# Patient Record
Sex: FEMALE | Race: BLACK OR AFRICAN AMERICAN | HISPANIC OR LATINO | Employment: STUDENT | URBAN - METROPOLITAN AREA
[De-identification: names, ages, dates, MRNs, and addresses within clinical notes are randomized per-mention and may not be internally consistent; named-entity substitution may affect disease eponyms.]

---
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
## 2023-01-10 ENCOUNTER — INITIAL PRENATAL (OUTPATIENT)
Dept: OBGYN CLINIC | Facility: CLINIC | Age: 18
End: 2023-01-10

## 2023-01-10 DIAGNOSIS — Z3A.13 13 WEEKS GESTATION OF PREGNANCY: Primary | ICD-10-CM

## 2023-01-10 DIAGNOSIS — O21.9 NAUSEA AND VOMITING OF PREGNANCY, ANTEPARTUM: ICD-10-CM

## 2023-01-10 DIAGNOSIS — Z34.92 PRENATAL CARE IN SECOND TRIMESTER: Primary | ICD-10-CM

## 2023-01-10 RX ORDER — FERROUS SULFATE TAB EC 324 MG (65 MG FE EQUIVALENT) 324 (65 FE) MG
324 TABLET DELAYED RESPONSE ORAL
Qty: 90 TABLET | Refills: 1 | Status: SHIPPED | OUTPATIENT
Start: 2023-01-10

## 2023-01-10 RX ORDER — PRENATAL VIT/IRON FUM/FOLIC AC 27MG-0.8MG
1 TABLET ORAL DAILY
Qty: 90 TABLET | Refills: 1 | Status: SHIPPED | OUTPATIENT
Start: 2023-01-10

## 2023-01-10 RX ORDER — DOCUSATE SODIUM 100 MG/1
100 CAPSULE, LIQUID FILLED ORAL 2 TIMES DAILY
Qty: 60 CAPSULE | Refills: 3 | Status: SHIPPED | OUTPATIENT
Start: 2023-01-10

## 2023-01-10 RX ORDER — ONDANSETRON 4 MG/1
4 TABLET, FILM COATED ORAL EVERY 8 HOURS PRN
Qty: 20 TABLET | Refills: 3 | Status: SHIPPED | OUTPATIENT
Start: 2023-01-10

## 2023-01-10 RX ORDER — ONDANSETRON 4 MG/1
4 TABLET, FILM COATED ORAL EVERY 8 HOURS PRN
Qty: 20 TABLET | Refills: 0 | Status: SHIPPED | OUTPATIENT
Start: 2023-01-10

## 2023-01-10 RX ORDER — PNV NO.95/FERROUS FUM/FOLIC AC 28MG-0.8MG
1 TABLET ORAL DAILY
Qty: 90 TABLET | Refills: 3 | Status: SHIPPED | OUTPATIENT
Start: 2023-01-10

## 2023-01-10 RX ORDER — FERROUS SULFATE TAB EC 324 MG (65 MG FE EQUIVALENT) 324 (65 FE) MG
324 TABLET DELAYED RESPONSE ORAL
Qty: 60 TABLET | Refills: 3 | Status: SHIPPED | OUTPATIENT
Start: 2023-01-10

## 2023-01-10 NOTE — PROGRESS NOTES
OB INTAKE INTERVIEW  Pt presents for OB intake    OB History    Para Term  AB Living   1             SAB IAB Ectopic Multiple Live Births                  # Outcome Date GA Lbr Adama/2nd Weight Sex Delivery Anes PTL Lv   1 Current              Hx of  delivery prior to 36 weeks 6 days:  N/A   If yes, place a referral for cervical surveillance at 16 weeks  Last Menstrual Period:    Patient's last menstrual period was 10/27/2022  Ultrasound date: 2022  12 weeks 6 days     Estimated Date of Delivery: 23   by US  H&P visit scheduled  2023 @ 1515  with Dr Berenice Dodson, I      Last pap smear: N/A     Current Issues:  Constipation :   No  Headaches :   Yes  Cramping:  Yes, light  Spotting :   No  PICA cravings :  No  FOB Involved: No  Planned pregnancy:  No  I have these concerns about this prenatal patient:   Pt presents to appt with mother  C/O nausea and vomiting even with Vit B6 & Unisom combo  Would like prescription for zofran, as well as iron pills, and PNV  Will send request to provider  Interested in discussing increase in asthma symptoms, lump in right armpit, and DNA testing at next visit  Would like to meet with financial counselor for assistance applying for [facility] and other [employer] help  Reports financial concern as she does not currently have a job  Mother reports transportation concerns as she pays for uber to/from appts  Information provided for both care management and financial counselor  Reports trouble sleeping at night  Reviewed safe medication list with pt  Daily THC use  Encouraged to cut down/stop  Would like to discuss further options with provider as she feels this helps with the nausea  Interview education  • [facility] Pregnancy Essentials reviewed and discussed   • [facility] Handout  • [facility] MFM Handout  • Discussed genetic testing  • Prenatal lab work: Scripts printed and given to pt     • Influenza vaccine given today: No  • Discussed Tdap vaccine  Immunizations:   Immunization History   Administered Date(s) Administered   • DTaP 2003, 2003, 2003, 07/14/2004, 08/16/2007   • HPV Quadrivalent 06/11/2014, 07/16/2014, 12/17/2014   • Hep A, adult 06/11/2014, 12/17/2014   • Hep B, Adolescent or Pediatric 2003, 2003, 2003   • HiB 2003, 2003, 2003, 07/14/2004   • INFLUENZA 10/11/2012, 10/02/2013, 03/28/2018   • IPV 2003, 2003, 2003, 08/16/2007   • Influenza, injectable, quadrivalent, preservative free 0 5 mL 11/12/2020   • MMR 07/14/2004, 08/16/2007   • Meningococcal MCV4P 06/11/2014, 11/12/2020   • Pneumococcal Conjugate PCV 7 2003, 2003, 07/14/2004   • Tdap 06/11/2014   • Varicella 07/14/2005, 05/10/2012     Depression Screening Follow-up Plan: Patient's depression screening was negative with an [place] score of  7  Clinically patient does not have depression  No treatment is required     Nurse/Family Partnership- referral placed:  Yes   If yes, place referral for nurse family partnership  BMI Counseling:   Tobacco Cessation Counseling: non-smoker  Infection Screening: Does the pt have a hx of MRSA? No  If yes- please follow MRSA protocol and obtain a nasal swab for MRSA culture  The patient was oriented to our practice and all questions were answered    Interviewed by: Sincere Graff RN 01/10/23

## 2023-01-10 NOTE — LETTER
[facility] Letter    Quinn Hatchet  2005  [facility] [facility] [address]       01/10/23          Quinn Hatchet is a patient and under our care in our office  Frank Tran's Estimated Date of Delivery: 7/8/23  Any questions or concerns feel free to contact our office       Thank you,  [address]  [address] [facility]/[address]  [facility] [place] [place]  [address]  [phone]

## 2023-01-10 NOTE — LETTER
Proof of Pregnancy Letter    Edgar Lombardo  2005  [address] [facility] [address]        01/10/23      Edgar Lombardo is a patient at our facility  Max Farias Estimated Date of Delivery: 7/8/23       Any questions or concerns, please feel free to contact our office  Sincerely,    1 [facility]    [address], [facility]

## 2023-01-10 NOTE — LETTER
Dentist Letter    Buckgrant Lombardo  2005  [address] [facility] [address]          01/10/23    We have had several requests from local dentist requesting permission to perform procedures on our patients who are pregnant  We wish to respond with this letter regarding some of the more routine procedures that we have been asked about  The following procedures may be performed on our obstetric patients:   1  Administration of local anesthesia   2  Administration of antibiotics such as PCN, Ampicillin, and Erythromycin  3  Administration of pain medications such as Tylenol, Tylenol with codeine, and if needed Percocet  4  Shielded X-rays    Should you have any questions, please do not hesitate to contact at [phone]          Sincerely,    1 [facility]   [phone]

## 2023-01-10 NOTE — LETTER
Work Letter    Rohit Benson  2005  [facility]    Dear Rohit Benson,      01/10/23        Your employee is a patient at [facility]  We recommend that all pregnant women:    1  Have a well-ventilated workspace  2  Wear low-heeled shoes  3  Work no more than 40 hours per week  4  Have a 15 minute break every 2 hours and at least 30 minutes for a meal break  5  Use good body mechanics by bending at your knees to avoid back strain and lift no more than 20 pounds without assistance  Will need assistance with lifting over 20 lbs  6  Have ready access to bathrooms and water  She may continue to work until her due date unless medical complications arise  We anticipate she may return to work in 6-8 weeks after delivery       Sincerely,  1 Arline Contreras

## 2023-01-13 ENCOUNTER — PATIENT OUTREACH (OUTPATIENT)
Dept: OBGYN CLINIC | Facility: CLINIC | Age: 18
End: 2023-01-13

## 2023-01-13 NOTE — PROGRESS NOTES
EDVIN LEWIS spoke with 22 y/o-S- G1-  English speaking young woman for pre addie assessment  Pt resides with her parents and siblings  Pt reported pregnancy was not intended but welcomed  Pt not sure of who is the baby father, both men are aware  Pt denies any current usage of alcohol or smoking  Pt using THC because it helps with her nausea  Pr was educated regarding THC side effects on the unborn baby and process after delivery with positive drug screen  Pt denies any Mental Health or Domestic violence hx  Pt has MA and need to get [facility]  Pt reported her mom and a friend are very supportive  Pt uses [employer] for transportation  Pt reported she has been feeling sad and anxious  EDVIN LEWIS discussed referral for [address] and Pt reported she has the phone number to call for intake and she will call them  EDVIN LEWIS offered assistance to schedule appointment but Pt want to call  EDVIN LEWIS explained her role and gave contact information  Pt was encouraged to call at any time needed

## 2023-01-19 ENCOUNTER — TELEPHONE (OUTPATIENT)
Facility: HOSPITAL | Age: 18
End: 2023-01-19

## 2023-01-19 NOTE — TELEPHONE ENCOUNTER
Patient called to schedule an earlier appointment before 2/20  Patient referral does not indicate a sooner appointment to be scheduled   Referred the patient to call OBGYN to obtain a new referral

## 2023-01-23 ENCOUNTER — APPOINTMENT (OUTPATIENT)
Dept: LAB | Facility: CLINIC | Age: 18
End: 2023-01-23

## 2023-01-23 DIAGNOSIS — Z34.92 PRENATAL CARE IN SECOND TRIMESTER: ICD-10-CM

## 2023-01-23 LAB
ABO GROUP BLD: NORMAL
AMORPH URATE CRY URNS QL MICRO: ABNORMAL
BACTERIA UR QL AUTO: ABNORMAL /HPF
BASOPHILS # BLD AUTO: 0.06 THOUSANDS/ÂΜL (ref 0–0.1)
BASOPHILS NFR BLD AUTO: 1 % (ref 0–1)
BILIRUB UR QL STRIP: NEGATIVE
BLD GP AB SCN SERPL QL: NEGATIVE
CLARITY UR: ABNORMAL
COLOR UR: YELLOW
EOSINOPHIL # BLD AUTO: 0.06 THOUSAND/ÂΜL (ref 0–0.61)
EOSINOPHIL NFR BLD AUTO: 1 % (ref 0–6)
ERYTHROCYTE [DISTWIDTH] IN BLOOD BY AUTOMATED COUNT: 13.8 % (ref 11.6–15.1)
GLUCOSE UR STRIP-MCNC: NEGATIVE MG/DL
HBV SURFACE AG SER QL: NORMAL
HCT VFR BLD AUTO: 39.5 % (ref 34.8–46.1)
HGB BLD-MCNC: 13.3 G/DL (ref 11.5–15.4)
HGB UR QL STRIP.AUTO: NEGATIVE
HIV 1+2 AB+HIV1 P24 AG SERPL QL IA: NORMAL
HIV 2 AB SERPL QL IA: NORMAL
HIV1 AB SERPL QL IA: NORMAL
HIV1 P24 AG SERPL QL IA: NORMAL
IMM GRANULOCYTES # BLD AUTO: 0.06 THOUSAND/UL (ref 0–0.2)
IMM GRANULOCYTES NFR BLD AUTO: 1 % (ref 0–2)
KETONES UR STRIP-MCNC: ABNORMAL MG/DL
LEUKOCYTE ESTERASE UR QL STRIP: ABNORMAL
LYMPHOCYTES # BLD AUTO: 2.41 THOUSANDS/ÂΜL (ref 0.6–4.47)
LYMPHOCYTES NFR BLD AUTO: 21 % (ref 14–44)
MCH RBC QN AUTO: 29.6 PG (ref 26.8–34.3)
MCHC RBC AUTO-ENTMCNC: 33.7 G/DL (ref 31.4–37.4)
MCV RBC AUTO: 88 FL (ref 82–98)
MONOCYTES # BLD AUTO: 0.75 THOUSAND/ÂΜL (ref 0.17–1.22)
MONOCYTES NFR BLD AUTO: 7 % (ref 4–12)
MUCOUS THREADS UR QL AUTO: ABNORMAL
NEUTROPHILS # BLD AUTO: 8.27 THOUSANDS/ÂΜL (ref 1.85–7.62)
NEUTS SEG NFR BLD AUTO: 69 % (ref 43–75)
NITRITE UR QL STRIP: NEGATIVE
NON-SQ EPI CELLS URNS QL MICRO: ABNORMAL /HPF
NRBC BLD AUTO-RTO: 0 /100 WBCS
PH UR STRIP.AUTO: 6 [PH]
PLATELET # BLD AUTO: 309 THOUSANDS/UL (ref 149–390)
PMV BLD AUTO: 10.6 FL (ref 8.9–12.7)
PROT UR STRIP-MCNC: ABNORMAL MG/DL
RBC # BLD AUTO: 4.49 MILLION/UL (ref 3.81–5.12)
RBC #/AREA URNS AUTO: ABNORMAL /HPF
RH BLD: POSITIVE
RPR SER QL: NORMAL
RUBV IGG SERPL IA-ACNC: 43.9 IU/ML
SP GR UR STRIP.AUTO: 1.02 (ref 1–1.03)
SPECIMEN EXPIRATION DATE: NORMAL
UROBILINOGEN UR STRIP-ACNC: <2 MG/DL
WBC # BLD AUTO: 11.61 THOUSAND/UL (ref 4.31–10.16)
WBC #/AREA URNS AUTO: ABNORMAL /HPF

## 2023-01-24 ENCOUNTER — ROUTINE PRENATAL (OUTPATIENT)
Dept: OBGYN CLINIC | Facility: CLINIC | Age: 18
End: 2023-01-24

## 2023-01-24 VITALS
HEART RATE: 86 BPM | SYSTOLIC BLOOD PRESSURE: 90 MMHG | DIASTOLIC BLOOD PRESSURE: 62 MMHG | BODY MASS INDEX: 27.82 KG/M2 | HEIGHT: 63 IN | RESPIRATION RATE: 18 BRPM | WEIGHT: 157 LBS

## 2023-01-24 DIAGNOSIS — J45.20 MILD INTERMITTENT ASTHMA, UNSPECIFIED WHETHER COMPLICATED: ICD-10-CM

## 2023-01-24 DIAGNOSIS — Z34.92 PRENATAL CARE IN SECOND TRIMESTER: Primary | ICD-10-CM

## 2023-01-24 DIAGNOSIS — Z3A.16 16 WEEKS GESTATION OF PREGNANCY: ICD-10-CM

## 2023-01-24 LAB — BACTERIA UR CULT: NORMAL

## 2023-01-24 RX ORDER — ALBUTEROL SULFATE 90 UG/1
2 AEROSOL, METERED RESPIRATORY (INHALATION) EVERY 6 HOURS PRN
Qty: 8.5 G | Refills: 2 | Status: SHIPPED | OUTPATIENT
Start: 2023-01-24

## 2023-01-24 NOTE — PROGRESS NOTES
OB/GYN  PRENATAL H&P VISIT  Marina Cruz  2023  1:26 PM  Dr Evangelina Closs, MD      SUBJECTIVE  Patient is a  at [facility] here for initial prenatal H&P  This is an unintended but desired pregnancy  She is currently doing well  She does not work and is not in school  She denies hx of STD/STI, denies a hx of TB or close contacts with persons with TB  She has not had MRSA  She reports she has Asthma, she uses an albuterol inhaler     She reports a brother who was born with VSD and had surgery as an infant  Maternal aunt with heart murmer  She denies recent travel or travel planned in the near future  She denies use of nicotine or recreational drug use  She denies use of ETOH  She denies vaginal bleeding, cramping, leakage, abnormal discharge  OB History    Para Term  AB Living   1 0 0 0 0 0   SAB IAB Ectopic Multiple Live Births   0 0 0 0 0      # Outcome Date GA Lbr Adama/2nd Weight Sex Delivery Anes PTL Lv   1 Current                Review of Systems   Constitutional: Negative for chills and fever  Respiratory: Negative for cough, shortness of breath and wheezing  Cardiovascular: Negative for chest pain  Gastrointestinal: Negative for abdominal pain, nausea and vomiting  Genitourinary: Negative for dysuria, hematuria, urgency, vaginal bleeding and vaginal discharge  Neurological: Negative for dizziness, weakness, light-headedness and headaches  Past Medical History:   Diagnosis Date   • Asthma    • No known problems        Past Surgical History:   Procedure Laterality Date   • NO PAST SURGERIES         Social History     Socioeconomic History   • Marital status: Single     Spouse name: Not on file   • Number of children: 0   • Years of education: 15   • Highest education level: High school graduate   Occupational History   • Not on file   Tobacco Use   • Smoking status: Never     Passive exposure:  Yes   • Smokeless tobacco: Never   • Tobacco comments: Exposure to secondhand smoke    Vaping Use   • Vaping Use: Never used   Substance and Sexual Activity   • Alcohol use: Never   • Drug use: Yes     Types: Marijuana     Comment: current everyday user   • Sexual activity: Yes     Partners: Male     Birth control/protection: Condom Male   Other Topics Concern   • Not on file   Social History Narrative    Always uses seat belt    Lives with grandparents     Lives with mother     Poor dental hygiene     Social Determinants of Health     Financial Resource Strain: High Risk   • Difficulty of Paying Living Expenses: Very hard   Food Insecurity: No Food Insecurity   • Worried About Running Out of Food in the Last Year: Never true   • Ran Out of Food in the Last Year: Never true   Transportation Needs: Unmet Transportation Needs   • Lack of Transportation (Medical): No   • Lack of Transportation (Non-Medical): Yes   Physical Activity: Not on file   Stress: No Stress Concern Present   • Feeling of Stress : Only a little   Social Connections: Socially Isolated   • Frequency of Communication with Friends and Family: Twice a week   • Frequency of Social Gatherings with Friends and Family: Once a week   • Attends [employer]ism Services: Never   • Active Member of Clubs or Organizations: No   • Attends Club or Organization Meetings: Never   • Marital Status: Never    Intimate Partner Violence: Not on file   Housing Stability: Low Risk    • Unable to Pay for Housing in the Last Year: No   • Number of Places Lived in the Last Year: 1   • Unstable Housing in the Last Year: No       OBJECTIVE  Vitals:    01/24/23 1319   BP: 90/62   Pulse: 86   Resp: 18     Physical Exam  Constitutional:       Appearance: She is well-developed  Cardiovascular:      Rate and Rhythm: Normal rate and regular rhythm  Heart sounds: Normal heart sounds  No murmur heard  No friction rub  No gallop  Pulmonary:      Effort: Pulmonary effort is normal  No respiratory distress        Breath sounds: No wheezing  Abdominal:      Palpations: Abdomen is soft  Tenderness: There is no abdominal tenderness  Musculoskeletal:         General: No tenderness  Neurological:      Mental Status: She is alert and oriented to person, place, and time  Vitals reviewed  ASSESSMENT AND PLAN    21 y o , , with BP 90/62 (BP Location: Left arm, Patient Position: Sitting, Cuff Size: Adult)   Pulse 86   Resp 18   Ht 5' 3" (1 6 m)   Wt 71 2 kg (157 lb)   LMP 10/27/2022 , at 16w3d here for her prenatal H&P  's by U/S    Problem List        Musculoskeletal and Integument    Acne       Other    Pregnancy    Overview     Dated via 1st trimester US on 22  JESSICA 2023  Initial OB H&P and intake not yet scheduled  Prenatal panel ordered today         Perineal abscess    Overview     S/p I&D on 22  Concern for hydradenitis suppurativa            Pregnancy: H&P completed today  PN Labs reviewed today  Labor expectations discussed with patient, including appointment schedule, nutrition, exercise, medications, sexual intercourse, and nausea/vomiting  Patient's BMI is 27  Recommended weight gain is 15-25lbs  Screening: Pap smear not indicated  GC/CT collected  Sequential screening reviewed with patient - will proceed QUAD screen  Consents: Delivery process including potential OVD and  reviewed  Sign delivery consent form at 28 weeks  Labor: Anticipate     Contraception: Different methods of contraception were discussed with patient, including progesterone only oral pills, depo provera, nexplanon, mirena, and paragard  Patient would like to use Depo-Provera during postpartum phase  Follow up: RTC in 4 weeks  Precautions regarding labor, leakage, bleeding, and fetal movement reviewed      Malia Bullock MD  2023  1:26 PM

## 2023-01-25 LAB
C TRACH DNA SPEC QL NAA+PROBE: NEGATIVE
N GONORRHOEA DNA SPEC QL NAA+PROBE: NEGATIVE

## 2023-02-20 ENCOUNTER — LAB (OUTPATIENT)
Dept: LAB | Facility: CLINIC | Age: 18
End: 2023-02-20

## 2023-02-20 ENCOUNTER — ROUTINE PRENATAL (OUTPATIENT)
Dept: PERINATAL CARE | Facility: CLINIC | Age: 18
End: 2023-02-20

## 2023-02-20 VITALS
WEIGHT: 155.8 LBS | BODY MASS INDEX: 27.61 KG/M2 | HEART RATE: 85 BPM | DIASTOLIC BLOOD PRESSURE: 60 MMHG | SYSTOLIC BLOOD PRESSURE: 100 MMHG | HEIGHT: 63 IN

## 2023-02-20 DIAGNOSIS — O99.612 GASTROESOPHAGEAL REFLUX DURING PREGNANCY IN SECOND TRIMESTER, ANTEPARTUM: ICD-10-CM

## 2023-02-20 DIAGNOSIS — O12.12: ICD-10-CM

## 2023-02-20 DIAGNOSIS — K21.9 GASTROESOPHAGEAL REFLUX DURING PREGNANCY IN SECOND TRIMESTER, ANTEPARTUM: ICD-10-CM

## 2023-02-20 DIAGNOSIS — Z36.86 ENCOUNTER FOR ANTENATAL SCREENING FOR CERVICAL LENGTH: ICD-10-CM

## 2023-02-20 DIAGNOSIS — Z34.92 PRENATAL CARE IN SECOND TRIMESTER: ICD-10-CM

## 2023-02-20 DIAGNOSIS — F12.90 MARIJUANA USE DURING PREGNANCY: ICD-10-CM

## 2023-02-20 DIAGNOSIS — Z3A.20 20 WEEKS GESTATION OF PREGNANCY: ICD-10-CM

## 2023-02-20 DIAGNOSIS — O99.512 ASTHMA COMPLICATING PREGNANCY IN SECOND TRIMESTER: Primary | ICD-10-CM

## 2023-02-20 DIAGNOSIS — O99.320 MARIJUANA USE DURING PREGNANCY: ICD-10-CM

## 2023-02-20 DIAGNOSIS — J45.909 ASTHMA COMPLICATING PREGNANCY IN SECOND TRIMESTER: Primary | ICD-10-CM

## 2023-02-20 PROBLEM — R11.10 HYPEREMESIS: Status: ACTIVE | Noted: 2023-02-20

## 2023-02-20 LAB
AMORPH URATE CRY URNS QL MICRO: ABNORMAL
BACTERIA UR QL AUTO: ABNORMAL /HPF
BILIRUB UR QL STRIP: NEGATIVE
CLARITY UR: ABNORMAL
COLOR UR: ABNORMAL
GLUCOSE UR STRIP-MCNC: NEGATIVE MG/DL
HGB UR QL STRIP.AUTO: NEGATIVE
KETONES UR STRIP-MCNC: ABNORMAL MG/DL
LEUKOCYTE ESTERASE UR QL STRIP: ABNORMAL
NITRITE UR QL STRIP: NEGATIVE
NON-SQ EPI CELLS URNS QL MICRO: ABNORMAL /HPF
PH UR STRIP.AUTO: 6 [PH]
PROT UR STRIP-MCNC: ABNORMAL MG/DL
RBC #/AREA URNS AUTO: ABNORMAL /HPF
SP GR UR STRIP.AUTO: >=1.03 (ref 1–1.03)
UROBILINOGEN UR STRIP-ACNC: <2 MG/DL
WBC #/AREA URNS AUTO: ABNORMAL /HPF

## 2023-02-20 RX ORDER — BUDESONIDE 90 UG/1
1 AEROSOL, POWDER RESPIRATORY (INHALATION) 2 TIMES DAILY
Qty: 1 EACH | Refills: 1 | Status: SHIPPED | OUTPATIENT
Start: 2023-02-20

## 2023-02-20 RX ORDER — FAMOTIDINE 20 MG/1
20 TABLET, FILM COATED ORAL 2 TIMES DAILY
Qty: 60 TABLET | Refills: 4 | Status: SHIPPED | OUTPATIENT
Start: 2023-02-20

## 2023-02-20 NOTE — PROGRESS NOTES
Patient chose to have [facility] Non-invasive Prenatal Screen with fetal sex  Patient given brochure and is aware [facility] will contact their insurance and coordinate coverage  Patient made aware she will need to respond to text message or e-mail from [employer] within 2 business days or testing will be run through insurance  Patient informed text message will come from area code  "415"  Provided [employer] # [phone] and web site : [email or web address]    "Parrott your test online" card with barcode and test tube ID provided to patient  Reviewed [facility]'s web site states 5-7 business days for results via their portal    [employer] message will be sent to patient when MFM receives results /provider reviews  2 vials of blood drawn from left arm by Karlo Hills   Patient tolerated blood draw without difficulty  Specimens labeled with patient identifiers (name, date of birth, specimen collection date), order and specimen were verified with patient, packed and sent via [employer]  Copy of lab order scanned to Epic media  [facility]etal [facility] will have results in approximately 7-10 business days and will call patient or notify via [address]  Patient aware viewing lab result online will reveal fetal sex if ordered  Patient verbalized understanding of all instructions and no questions at this time

## 2023-02-20 NOTE — PROGRESS NOTES
Ultrasound Probe Disinfection    A transvaginal ultrasound was performed  Prior to use, disinfection was performed with High Level Disinfection Process (Trophon)  Probe serial number B1: [id number] was used        Chucho Chacon  02/20/23  11:05 AM

## 2023-02-20 NOTE — LETTER
2023     Courtney Colon MD  [address]    Patient: Dalila Colin   YOB: 2005   Date of Visit: 2023       Dear Dr Mary Henry: Thank you for referring Abigail Davis to me for evaluation  Below are my notes for this consultation  If you have questions, please do not hesitate to call me  I look forward to following your patient along with you  Sincerely,        Geoff Stewart MD        CC: No Recipients  Geoff Stewart MD  2023  8:58 PM  Sign when Signing Visit  Ruiz Rosario Md  [address]  [facility],  [address] [facility]      Dear Dr Mary Henry     Thank you for requesting a  consultation on your patient Dalila Colin for the following indications: Fetal anatomical survey and genetic screening    History  Past medical history: Asthma and depression  Past surgical history: None  Medications: Prenatal vitamins, Unisom, vitamin B6, Colace, Zofran, albuterol inhaler  Allergies to medications: None  Past obstetrical history:  1 para 0  Social history: Reports use of marijuana prior to pregnancy and during pregnancy  Currently at this point she is using it for symptoms of depression and also to help with her nausea and vomiting of pregnancy  She reports she had been trying to get on medical marijuana but currently is using illicit marijuana  First generation family history: none    Ultrasound findings: Her ultrasound today shows a fetus that is growing concordant with her first trimester ultrasound  No malformations are detected but her fetus is in a difficult position and we were unable to complete the anatomical survey  Her cervix appears normal in length and the amniotic fluid appears normal      The patient was informed of the findings and counseled about the limitations of the exam in detecting all forms of fetal congenital abnormalities      She does not report any vaginal bleeding or uterine cramping/contractions  She does feel fetal movement  On her questionnaire she reports shortness of breath and chest pain that occurs when she has an asthma exacerbation  She reports she is using her albuterol inhaler daily  She continues to report vomiting and excessive thirst and reflux symptoms  She is on Unisom, vitamin B6, Zofran without success  She uses marijuana for both her depression and her nausea  She reports intermittent headaches and depression  She denies any suicidal thoughts  She was here today with her grandmother  Specific counseling was provided on the following problems:  1   1+ proteinuria on 2/20/23 and neg to > 300 on several UAs during an emergency room visit on 11/29/22 -  ? etiology  Urine cx 1/23/23 was normal   2    Persistent nausea and vomiting in pregnancy with symptoms of reflux  I sent a prescription for Pepcid that she can take 30 minutes before breakfast and 30 minutes before dinner  3    Marijuana use in pregnancy  4  She reports using her albuterol inhaler daily-prescription for an inhaled steroid for her to use daily to lessen her need for her to use her albuterol  5     We discussed the options of genetic screening and screening for spina bifida with MSAFP since we were unable to see the spine well  She is not interested in invasive testing but would be interested in NIPT  Follow up recommended:   MSAFP was ordered  NIPT was ordered  Prescription sent for inhaled steroids  Prescription sent for Pepcid  Advised against use of illicit marijuana in pregnancy  If she feels she is unable to quit recommend she get her medical marijuana card  Discussed that in some patients with hyperemesis of pregnancy that marijuana can actually make her symptoms worse  We reviewed that some illicit marijuana may be laced with other drugs that could fatally harm her or her baby  Recommend a follow-up ultrasound in 4 weeks for missed anatomy    Due to the 1+ proteinuria-I ordered a urinalysis and PC ratio with reflex to culture  If abnormal PC ratio found recommend a 24 hr protein to quantify how much protein is in her urine  She is seeing her OB provider in 3 days  Pre visit time reviewing her records  10 minutes  Face to face time 20 minutes  Post visit time on documentation of note, updating her problem list, adding orders and prescriptions 20 minutes  Procedures that were completed today were charged separately  The level of decision making was moderate complexity      Jose Matute MD

## 2023-02-20 NOTE — PROGRESS NOTES
Jaye Ceja Md  [address] [place] [address],  [address] [facility]     Dear Dr Myrna Knight     Thank you for requesting a  consultation on your patient Felicia Perdue for the following indications: Fetal anatomical survey and genetic screening    History  Past medical history: Asthma and depression  Past surgical history: None  Medications: Prenatal vitamins, Unisom, vitamin B6, Colace, Zofran, albuterol inhaler  Allergies to medications: None  Past obstetrical history:  1 para 0  Social history: Reports use of marijuana prior to pregnancy and during pregnancy  Currently at this point she is using it for symptoms of depression and also to help with her nausea and vomiting of pregnancy  She reports she had been trying to get on medical marijuana but currently is using illicit marijuana  First generation family history: none    Ultrasound findings: Her ultrasound today shows a fetus that is growing concordant with her first trimester ultrasound  No malformations are detected but her fetus is in a difficult position and we were unable to complete the anatomical survey  Her cervix appears normal in length and the amniotic fluid appears normal      The patient was informed of the findings and counseled about the limitations of the exam in detecting all forms of fetal congenital abnormalities  She does not report any vaginal bleeding or uterine cramping/contractions  She does feel fetal movement  On her questionnaire she reports shortness of breath and chest pain that occurs when she has an asthma exacerbation  She reports she is using her albuterol inhaler daily  She continues to report vomiting and excessive thirst and reflux symptoms  She is on Unisom, vitamin B6, Zofran without success  She uses marijuana for both her depression and her nausea  She reports intermittent headaches and depression  She denies any suicidal thoughts  She was here today with her grandmother  Specific counseling was provided on the following problems:  1   1+ proteinuria on 2/20/23 and neg to > 300 on several UAs during an emergency room visit on 11/29/22 -  ? etiology  Urine cx 1/23/23 was normal   2    Persistent nausea and vomiting in pregnancy with symptoms of reflux  I sent a prescription for Pepcid that she can take 30 minutes before breakfast and 30 minutes before dinner  3    Marijuana use in pregnancy  4  She reports using her albuterol inhaler daily-prescription for an inhaled steroid for her to use daily to lessen her need for her to use her albuterol  5     We discussed the options of genetic screening and screening for spina bifida with MSAFP since we were unable to see the spine well  She is not interested in invasive testing but would be interested in NIPT  Follow up recommended:   MSAFP was ordered  NIPT was ordered  Prescription sent for inhaled steroids  Prescription sent for Pepcid  Advised against use of illicit marijuana in pregnancy  If she feels she is unable to quit recommend she get her medical marijuana card  Discussed that in some patients with hyperemesis of pregnancy that marijuana can actually make her symptoms worse  We reviewed that some illicit marijuana may be laced with other drugs that could fatally harm her or her baby  Recommend a follow-up ultrasound in 4 weeks for missed anatomy  Due to the 1+ proteinuria-I ordered a urinalysis and PC ratio with reflex to culture  If abnormal PC ratio found recommend a 24 hr protein to quantify how much protein is in her urine  She is seeing her OB provider in 3 days  Pre visit time reviewing her records  10 minutes  Face to face time 20 minutes  Post visit time on documentation of note, updating her problem list, adding orders and prescriptions 20 minutes  Procedures that were completed today were charged separately  The level of decision making was moderate complexity      Nicholas Farnsworth MD

## 2023-02-21 LAB
BACTERIA UR CULT: NORMAL
CREAT UR-MCNC: 407 MG/DL
PROT UR-MCNC: 31 MG/DL
PROT/CREAT UR: 0.08 MG/G{CREAT} (ref 0–0.1)

## 2023-02-22 PROBLEM — Z3A.20 20 WEEKS GESTATION OF PREGNANCY: Status: ACTIVE | Noted: 2022-12-30

## 2023-02-22 LAB
2ND TRIMESTER 4 SCREEN SERPL-IMP: NORMAL
AFP ADJ MOM SERPL: 2.01
AFP INTERP AMN-IMP: NORMAL
AFP INTERP SERPL-IMP: NORMAL
AFP INTERP SERPL-IMP: NORMAL
AFP SERPL-MCNC: 82 NG/ML
AGE AT DELIVERY: 20.3 YR
GA METHOD: NORMAL
GA: 16.6 WEEKS
IDDM PATIENT QL: NO
MULTIPLE PREGNANCY: NO
NEURAL TUBE DEFECT RISK FETUS: 1576 %

## 2023-02-22 NOTE — PROGRESS NOTES
[address] [facility]  [id number]  2005        A/P:  Problem List        Digestive    Gastroesophageal reflux during pregnancy in second trimester, antepartum    Overview     Prescribed Pepcid         Hyperemesis       Respiratory    Asthma complicating pregnancy in second trimester    Overview     Prescribed inhaled steroid            Musculoskeletal and Integument    Acne       Other    20 weeks gestation of pregnancy    Overview     Dated via 1st trimester US on 22 -> JESSICA 2023  - Doing well today  - Continue prenatal vitamin  - Prenatal labs: wnl  - Initial OB U/S: wnl  - MSAFP low risk  - Level 2 U/S: wnl -> needs f/up growth  - 28 week labs [ ]   - Tdap [ ]   - Rhogam [ ]   - Contraception plan: [ ] depo vs IUD? Discuss again  - RTO in 4 weeks  - All questions answered to patient satisfaction         Perineal abscess    Overview     S/p I&D on 22  Reports residual "lump" but no longer having pain         Marijuana use during pregnancy    Overview     Using for depression and her hyperemesis-if she she cannot stop recommend she obtain a medical marijuana card and avoid buying illicit drug         Current Assessment & Plan     Has cut down significantly to once a day, trying to stop completely         Proteinuria affecting pregnancy, antepartum, second trimester    Overview     PC ratio normal on   Looks like she develops proteinuria in response to dehydration and positive ketones and this resolves when she is no longer dehydrated  S: 21 y o   20w5d here for PN visit  She denies contractions  She denies leakage of fluid and vaginal bleeding  She has felt good fetal movement  Currently still using her albuterol daily, just started the inhaled steroid and has been taking the pepcid twice a day with meals      O:  Vitals:    23 1100   BP: 101/66   Pulse: 76     Physical Exam  GEN: The patient was alert and oriented x3, pleasant well-appearing female in no acute distress     CV: Regular rate  PULM: Non-labored respirations  MSK: Normal gait  Skin: Warm, dry  Neuro: No focal deficits  Psych: Normal affect and judgement, cooperative    Fetal Heart Rate: 140       D/w Dr Brenda Johnson MD  OB/GYN PGY-2  2/24/2023  7:41 AM

## 2023-02-23 ENCOUNTER — ROUTINE PRENATAL (OUTPATIENT)
Dept: OBGYN CLINIC | Facility: CLINIC | Age: 18
End: 2023-02-23

## 2023-02-23 VITALS
DIASTOLIC BLOOD PRESSURE: 66 MMHG | HEART RATE: 76 BPM | WEIGHT: 160 LBS | SYSTOLIC BLOOD PRESSURE: 101 MMHG | HEIGHT: 63 IN | BODY MASS INDEX: 28.35 KG/M2

## 2023-02-23 DIAGNOSIS — O12.12: ICD-10-CM

## 2023-02-23 DIAGNOSIS — F12.90 MARIJUANA USE DURING PREGNANCY: ICD-10-CM

## 2023-02-23 DIAGNOSIS — K21.9 GASTROESOPHAGEAL REFLUX DURING PREGNANCY IN SECOND TRIMESTER, ANTEPARTUM: ICD-10-CM

## 2023-02-23 DIAGNOSIS — J45.909 ASTHMA COMPLICATING PREGNANCY IN SECOND TRIMESTER: ICD-10-CM

## 2023-02-23 DIAGNOSIS — L02.215 PERINEAL ABSCESS: ICD-10-CM

## 2023-02-23 DIAGNOSIS — O99.512 ASTHMA COMPLICATING PREGNANCY IN SECOND TRIMESTER: ICD-10-CM

## 2023-02-23 DIAGNOSIS — O99.612 GASTROESOPHAGEAL REFLUX DURING PREGNANCY IN SECOND TRIMESTER, ANTEPARTUM: ICD-10-CM

## 2023-02-23 DIAGNOSIS — Z3A.20 20 WEEKS GESTATION OF PREGNANCY: Primary | ICD-10-CM

## 2023-02-23 DIAGNOSIS — O99.320 MARIJUANA USE DURING PREGNANCY: ICD-10-CM

## 2023-02-23 RX ORDER — FERROUS SULFATE TAB EC 324 MG (65 MG FE EQUIVALENT) 324 (65 FE) MG
TABLET DELAYED RESPONSE ORAL
COMMUNITY
Start: 2023-02-22

## 2023-02-23 NOTE — RESULT ENCOUNTER NOTE
Max,      Your MSAFP returned as normal   A urinalysis still shows 1+ protein from 2 days ago   A urine culture came back negative   Your PC ratio is normal   Interestingly every time you have ketones in your urine (which indicates that you are dehydrated ) you also have protein in your urine  The one-time 2 months ago that you had no ketones in your urine, there was no protein in your urine   It is well-known that dehydration may cause temporary proteinuria   If the body loses and does not replace fluids it cannot deliver the necessary nutrients to the kidneys and this causes problems with the way the kidneys reabsorb protein   You are seeing your [facility] provider tomorrow   Hopefully the Pepcid I prescribed on 2/20/23 is helping you       Gina Dubose MD

## 2023-02-23 NOTE — RESULT ENCOUNTER NOTE
Jodie Shetty MSAFP returned as normal   A urinalysis still shows 1+ protein from 2 days ago  A urine culture came back negative  Your PC ratio is normal   Interestingly every time you have ketones in your urine (which indicates that you are dehydrated ) you also have protein in your urine  The one-time 2 months ago that you had no ketones in your urine, there was no protein in your urine  It is well-known that dehydration may cause temporary proteinuria  If the body loses and does not replace fluids it cannot deliver the necessary nutrients to the kidneys and this causes problems with the way the kidneys reabsorb protein  You are seeing your [facility] provider tomorrow  Hopefully the Pepcid I prescribed on 2/20/23 is helping you       Noralyn Goodpasture MD

## 2023-02-23 NOTE — RESULT ENCOUNTER NOTE
Max,      Your MSAFP returned as normal   A urinalysis still shows 1+ protein from 2 days ago   A urine culture came back negative   Your PC ratio is normal   Interestingly every time you have ketones in your urine (which indicates that you are dehydrated ) you also have protein in your urine  The one-time 2 months ago that you had no ketones in your urine, there was no protein in your urine   It is well-known that dehydration may cause temporary proteinuria   If the body loses and does not replace fluids it cannot deliver the necessary nutrients to the kidneys and this causes problems with the way the kidneys reabsorb protein   You are seeing your [place] provider tomorrow   Hopefully the Pepcid I prescribed on 2/20/23 is helping you       Gina Dubose MD

## 2023-03-15 ENCOUNTER — HOSPITAL ENCOUNTER (EMERGENCY)
Facility: HOSPITAL | Age: 18
Discharge: HOME | End: 2023-03-15
Attending: EMERGENCY MEDICINE

## 2023-03-15 VITALS
DIASTOLIC BLOOD PRESSURE: 54 MMHG | RESPIRATION RATE: 18 BRPM | SYSTOLIC BLOOD PRESSURE: 94 MMHG | OXYGEN SATURATION: 100 % | HEART RATE: 77 BPM | TEMPERATURE: 98.1 F

## 2023-03-15 DIAGNOSIS — R19.7 NAUSEA VOMITING AND DIARRHEA: ICD-10-CM

## 2023-03-15 DIAGNOSIS — K52.9 GASTROENTERITIS: Primary | ICD-10-CM

## 2023-03-15 DIAGNOSIS — R11.2 NAUSEA VOMITING AND DIARRHEA: ICD-10-CM

## 2023-03-15 LAB
ANION GAP SERPL CALCULATED.3IONS-SCNC: 5 MMOL/L (ref 4–13)
ATRIAL RATE: 95 BPM
BUN SERPL-MCNC: 10 MG/DL (ref 5–25)
CALCIUM SERPL-MCNC: 9.5 MG/DL (ref 8.3–10.1)
CHLORIDE SERPL-SCNC: 108 MMOL/L (ref 96–108)
CO2 SERPL-SCNC: 25 MMOL/L (ref 21–32)
CREAT SERPL-MCNC: 0.53 MG/DL (ref 0.6–1.3)
GFR SERPL CREATININE-BSD FRML MDRD: 138 ML/MIN/1.73SQ M
GLUCOSE SERPL-MCNC: 83 MG/DL (ref 65–140)
P AXIS: 65 DEGREES
POTASSIUM SERPL-SCNC: 3.7 MMOL/L (ref 3.5–5.3)
PR INTERVAL: 142 MS
QRS AXIS: 47 DEGREES
QRSD INTERVAL: 78 MS
QT INTERVAL: 334 MS
QTC INTERVAL: 419 MS
SODIUM SERPL-SCNC: 138 MMOL/L (ref 135–147)
T WAVE AXIS: 47 DEGREES
VENTRICULAR RATE: 95 BPM

## 2023-03-15 RX ORDER — DICYCLOMINE HCL 20 MG
20 TABLET ORAL 2 TIMES DAILY
Qty: 20 TABLET | Refills: 0 | Status: SHIPPED | OUTPATIENT
Start: 2023-03-15 | End: 2023-03-24

## 2023-03-15 RX ORDER — ONDANSETRON 2 MG/ML
4 INJECTION INTRAMUSCULAR; INTRAVENOUS ONCE
Status: COMPLETED | OUTPATIENT
Start: 2023-03-15 | End: 2023-03-15

## 2023-03-15 RX ORDER — ONDANSETRON 4 MG/1
4 TABLET, FILM COATED ORAL EVERY 8 HOURS PRN
Qty: 12 TABLET | Refills: 0 | Status: SHIPPED | OUTPATIENT
Start: 2023-03-15 | End: 2023-03-24

## 2023-03-15 RX ADMIN — DOXYLAMINE SUCCINATE 25 MG: 25 TABLET ORAL at 15:04

## 2023-03-15 RX ADMIN — ONDANSETRON 4 MG: 2 INJECTION INTRAMUSCULAR; INTRAVENOUS at 14:39

## 2023-03-15 RX ADMIN — SODIUM CHLORIDE 1000 ML: 0.9 INJECTION, SOLUTION INTRAVENOUS at 14:39

## 2023-03-15 NOTE — ED PROCEDURE NOTE
Procedure  POC Pelvic US    Date/Time: 3/15/2023 3:59 PM  Performed by: Dee Dee Francis DO  Authorized by: Dee Dee Francis DO     Patient location:  ED  Procedure details:     Exam Type:  Educational    Indications: evaluate for IUP      Technique:  Transabdominal obstetric (HCG+) exam  Uterine findings:     Fetal heart rate (bpm):  150  Interpretation:     Ultrasound impressions: normal      Pregnancy findings: intrauterine pregnancy (IUP)                       Dee Dee Francis DO  03/15/23 1605

## 2023-03-15 NOTE — DISCHARGE INSTRUCTIONS
Your workup here was not concerning for anything dangerous  Therefore there is no need for you to stay at the hospital for further testing  We feel safe to send you home  You can use Zofran and Bentyl for management of your symptoms  You should follow up with your OBGYN to assess for resolution of your symptoms and to determine if there is further evaluation that needs to be performed      Return to the emergency department if you have any symptoms of worsening vomiting or abdominal pain

## 2023-03-15 NOTE — ED PROVIDER NOTES
Chief Complaint   Patient presents with   • Vomiting     Pt reports abdominal "tensing/un-tensing" when vomiting and vomiting since 0600 today; unrelieved by ginger ale and pt could not keep it down; pt is 24 weeks pregnant     History of Present Illness and Review of Systems   This is a 21 y o  female with PMH significant for asthma, G1PO at 24 weeks gestation coming in today with complaint of nausea and vomiting and diarrhea  She reports that this started approximately 0600  Reports numerous episodes of clear emesis in addition to nonbloody diarrhea, reports no relief  She has been unable to tolerate p o  No alleviating factors however she has not been taking medications  She reports she previously had hyperemesis gravidarum however no other complications with her pregnancy  She denies any abdominal pain, only feeling of bloating currently  Otherwise denies any vaginal bleeding, discharge, loss of fluid, decreased fetal movement, bloody emesis, bloody diarrhea  She has no history of abdominal surgery  She reports she uses marijuana occasionally throughout this pregnancy however denies any other drug use  She does have appropriate prenatal care  Denies any episodes of chest pain, shortness of breath, episodes of syncope  She does report multiple sick contacts  No other symptoms currently  Remainder of ROS Reviewed and Non-Pertinent    No other complaints for this encounter     - No language barrier    - History obtained from patient and chart   - Reviewed relevant past medical/family/social history  - There are no limitations to the history obtained  Past Medical, Past Surgical History:    has a past medical history of Asthma and No known problems  has a past surgical history that includes No past surgeries       Allergies:   No Known Allergies    Social and Family History:     Social History     Substance and Sexual Activity   Alcohol Use Never     Social History     Tobacco Use   Smoking Status Never   • Passive exposure: Yes   Smokeless Tobacco Never   Tobacco Comments    Exposure to secondhand smoke      Social History     Substance and Sexual Activity   Drug Use Yes   • Types: Marijuana    Comment: current everyday user       Physical Examination     Vitals:    03/15/23 1320 03/15/23 1443   BP: 116/78 94/54   BP Location:  Right arm   Pulse: 87 77   Resp: 20 18   Temp: 98 1 °F (36 7 °C)    TempSrc: Tympanic    SpO2: 100% 100%     Physical Exam  Vitals and nursing note reviewed  Constitutional:       General: She is not in acute distress  Appearance: She is well-developed  HENT:      Head: Normocephalic and atraumatic  Nose: Nose normal       Mouth/Throat:      Mouth: Mucous membranes are moist    Eyes:      Conjunctiva/sclera: Conjunctivae normal    Cardiovascular:      Rate and Rhythm: Normal rate and regular rhythm  Heart sounds: No murmur heard  Pulmonary:      Effort: Pulmonary effort is normal  No respiratory distress  Breath sounds: Normal breath sounds  Abdominal:      Palpations: Abdomen is soft  Tenderness: There is no abdominal tenderness  There is no guarding or rebound  Comments: Gravid uterus consistent with 24 weeks   Musculoskeletal:         General: No swelling  Cervical back: Neck supple  Skin:     General: Skin is warm and dry  Capillary Refill: Capillary refill takes less than 2 seconds  Neurological:      General: No focal deficit present  Mental Status: She is alert     Psychiatric:         Mood and Affect: Mood normal          Behavior: Behavior normal             Risk Stratification Tools                Orders Placed This Encounter   Procedures   • Pelvic Ultrasound   • Pelvic Ultrasound   • Basic metabolic panel   • ECG 12 lead       Labs:     Labs Reviewed   BASIC METABOLIC PANEL - Abnormal       Result Value Ref Range Status    Sodium 138  135 - 147 mmol/L Final    Potassium 3 7  3 5 - 5 3 mmol/L Final    Chloride 108 96 - 108 mmol/L Final    CO2 25  21 - 32 mmol/L Final    ANION GAP 5  4 - 13 mmol/L Final    BUN 10  5 - 25 mg/dL Final    Creatinine 0 53 (*) 0 60 - 1 30 mg/dL Final    Comment: Standardized to IDMS reference method    Glucose 83  65 - 140 mg/dL Final    Comment: If the patient is fasting, the ADA then defines impaired fasting glucose as > 100 mg/dL and diabetes as > or equal to 123 mg/dL  Specimen collection should occur prior to Sulfasalazine administration due to the potential for falsely depressed results  Specimen collection should occur prior to Sulfapyridine administration due to the potential for falsely elevated results      Calcium 9 5  8 3 - 10 1 mg/dL Final    eGFR 138  ml/min/1 73sq m Final    Narrative:     [place] guidelines for Chronic Kidney Disease (CKD):   •  Stage 1 with normal or high GFR (GFR > 90 mL/min/1 73 square meters)  •  Stage 2 Mild CKD (GFR = 60-89 mL/min/1 73 square meters)  •  Stage 3A Moderate CKD (GFR = 45-59 mL/min/1 73 square meters)  •  Stage 3B Moderate CKD (GFR = 30-44 mL/min/1 73 square meters)  •  Stage 4 Severe CKD (GFR = 15-29 mL/min/1 73 square meters)  •  Stage 5 End Stage CKD (GFR <15 mL/min/1 73 square meters)  Note: GFR calculation is accurate only with a steady state creatinine       Imaging:     No orders to display          Procedures   POC Pelvic US    Date/Time: 3/15/2023 6:30 PM  Performed by: Lashay He MD  Authorized by: Lashay He MD     Other Assisting Provider: Yes (comment) (Zohaib Fine DO)    Procedure details:     Exam Type:  Diagnostic    Indications: evaluate for IUP      Assessment for: evaluate fetal viability      Technique:  Transabdominal obstetric (HCG+) exam    Views obtained: uterus (transverse and sagittal)      Image quality: diagnostic      Image availability:  Images available in PACS  Uterine findings:     Endometrial stripe: identified      Intrauterine pregnancy: identified      Single gestation: identified      Fetal heart rate: identified      Fetal heart rate (bpm):  145  Interpretation:     Ultrasound impressions: normal      Pregnancy findings: intrauterine pregnancy (IUP)            MDM:   Medical Decision Making  Mumtaz Chino is a 21 y o  who presents with complaints of vomiting and diarrhea    Vital signs are unremarkable, physical exam shows patient is well-appearing, moist mucous membranes, no abdominal tenderness, no pallor, benign cardiorespiratory exam, no neurologic deficits    Ddx: Overall appears consistent with gastroenteritis, less likely hyperemesis gravidarum given 24 weeks gestation  Unlikely to be appendicitis or biliary related given her lack of abdominal pain  Possibly related to an occult UTI therefore will evaluate for this  Plan: EMP, UA, IVF, Zofran, Unisom, will reassess, and bedside ultrasound      Gastroenteritis: acute illness or injury  Amount and/or Complexity of Data Reviewed  Labs: ordered  Decision-making details documented in ED Course  Risk  OTC drugs  Prescription drug management  ED Course as of 03/15/23 1832   Wed Mar 15, 2023   1516 eGFR: 138   1520 Pt well appearing at this time, sleeping earlier  Symtpoms well controlled  Will Bedside US and anticipate outpatient follow up   1601 Pt well appearing at this time  US shows + fetal movement, HR of 145   1601 Will send home with Zofran and Bentyl and recommend outpatient follow up       Summary: Overall consistent with acute gastroenteritis  Fetal ultrasound showed positive fetal movement and heart rate of 145 which is reassuring  Therefore felt safe to send home with Zofran and Bentyl and recommendations for outpatient follow-up  Recommended close follow-up  Advised on strict return precautions  Final Dispo   Final Diagnosis:  1  Gastroenteritis    2   Nausea vomiting and diarrhea      Time reflects when diagnosis was documented in both MDM as applicable and the Disposition within this note     Time User Action Codes Description Comment    3/15/2023  4:02 PM Giacomo Bower Add [K52 9] Gastroenteritis     3/15/2023  4:02 PM Giacomo Bower Add [R11 2,  R19 7] Nausea vomiting and diarrhea       ED Disposition     ED Disposition   Discharge    Condition   Stable    Date/Time   Wed Mar 15, 2023  4:02 PM    Comment   Ana Paula discharge to home/self care  Follow-up Information     Follow up With Specialties Details Why Contact Info Additional [address] Emergency Department Emergency Medicine  If symptoms worsen [address] [id number]  [address] Emergency Department, [address], [address] [phone]        Medications   sodium chloride 0 9 % bolus 1,000 mL (0 mL Intravenous Stopped 3/15/23 1622)   ondansetron (ZOFRAN) injection 4 mg (4 mg Intravenous Given 3/15/23 1439)   doxylamine (UNISOM) tablet 25 mg (25 mg Oral Given 3/15/23 1504)       All details of the evaluation and treatment plan were made clear and additionally all questions and concerns were addressed while under my care  Portions of the record may have been created with voice recognition software  Occasional wrong word or "sound a like" substitutions may have occurred due to the inherent limitations of voice recognition software  Read the chart carefully and recognize, using context, where substitutions have occurred  The attending physician physically available and evaluated the above patient alongside myself          Faye Valera MD  03/15/23 8176

## 2023-03-15 NOTE — ED ATTENDING ATTESTATION
3/15/2023  Nery Sagastume DO, saw and evaluated the patient  I have discussed the patient with the resident/non-physician practitioner and agree with the resident's/non-physician practitioner's findings, Plan of Care, and MDM as documented in the resident's/non-physician practitioner's note, except where noted  All available labs and Radiology studies were reviewed  I was present for key portions of any procedure(s) performed by the resident/non-physician practitioner and I was immediately available to provide assistance  At this point I agree with the current assessment done in the Emergency Department  I have conducted an independent evaluation of this patient a history and physical is as follows:    Patient presents for complaint of recurrent vomiting since early this morning  She is 26 weeks pregnant with good prenatal care and a normal pregnancy to date  She is   She reports 5 or 6 episodes of stomach contents then bilious vomiting today associated with 2 episodes of diarrhea  She continues to feel nauseated  In her first trimester, she had hyperemesis that was treated with Unisom and resolved as the pregnancy progressed  She does smoke marijuana weekly  No recent travel or similar sick contacts  Denies f/c, lightheadedness/dizziness, diaphoresis, CP, SOB, abdominal pain, dysuria  12 system ROS o/w negative  PE: NAD, appears mildly uncomfortable, alert; PERRL, EOMI; MMM, no posterior oropharyngeal exudate, edema or erythema; HRR, no murmur; lungs CTA w/o w/r/r, POx 100% on RA (nl); abdomen s/nt, normal for gestation without r/g/r, nl BS in all 4 quadrant; (-) LE edema or calf TTP, FROM extremities x4; skin p/w/d; CNs GI/NF, oriented  MDM: Nausea/vomiting/diarrhea -gastroenteritis, GI virus, less likely but at risk for hyperemesis, UTI, threatened miscarriage, dehydration/MIGUELANGEL  I independently reviewed and interpreted ordered labs from this encounter      A/P: Will check renal function, BS OB US, urine, hydrate, treat symptoms, reevaluate for further work up and disposition      ED Course         Critical Care Time  Procedures

## 2023-03-22 PROBLEM — Z3A.24 24 WEEKS GESTATION OF PREGNANCY: Status: ACTIVE | Noted: 2022-12-30

## 2023-03-22 NOTE — PROGRESS NOTES
[id number] [facility]  [id number]  2005        A/P:  Problem List        Digestive    Gastroesophageal reflux during pregnancy in second trimester, antepartum    Overview     Improved with pepcid         Hyperemesis       Respiratory    Asthma complicating pregnancy in second trimester    Overview     Prescribed inhaled steroid            Musculoskeletal and Integument    Acne       Other    24 weeks gestation of pregnancy    Overview     Dated via 1st trimester US on 22 -> JESSICA 2023  - Doing well today  - Continue prenatal vitamin  - Prenatal labs: wnl  - Initial OB U/S: wnl  - MSAFP low risk  - Level 2 U/S: wnl -> f/up growth scheduled 3/24  - 28 week labs [ ] ordered today  - Tdap [ ]   - Rhogam [ ]   - Contraception plan: Depo Provera  - RTO in 4 weeks  - All questions answered to patient satisfaction         Current Assessment & Plan     Seen in [facility] ED on 3/15 due to N/V thought to be due to gastroenteritis         Perineal abscess    Overview     S/p I&D on 22  Reports residual "lump" but no longer having pain         Marijuana use during pregnancy    Overview     Using for depression and her hyperemesis-if she she cannot stop recommend she obtain a medical marijuana card and avoid buying illicit drug         Proteinuria affecting pregnancy, antepartum, second trimester    Overview     PC ratio normal on   Looks like she develops proteinuria in response to dehydration and positive ketones and this resolves when she is no longer dehydrated  S: 21 y o   24w5d here for PN visit  She denies contractions  She denies leakage of fluid and vaginal bleeding  She has started to feel good fetal movement  Reports the pepcid has been helping with her reflux  Glasses are broken so she has been squinting to see and having headaches, will hopefully make an appointment with the eye doctor soon      O:  Vitals:    23 1000   BP: 116/59 Pulse: 74     Physical Exam  GEN: The patient was alert and oriented x3, pleasant well-appearing female in no acute distress     CV: Regular rate  PULM: Non-labored respirations  MSK: Normal gait  Skin: Warm, dry  Neuro: No focal deficits  Psych: Normal affect and judgement, cooperative    Fetal Heart Rate: 137       D/w Dr Sole Dangelo MD  OB/GYN PGY-2  3/23/2023  11:13 AM

## 2023-03-23 ENCOUNTER — ROUTINE PRENATAL (OUTPATIENT)
Dept: OBGYN CLINIC | Facility: CLINIC | Age: 18
End: 2023-03-23

## 2023-03-23 VITALS
HEIGHT: 63 IN | WEIGHT: 166 LBS | DIASTOLIC BLOOD PRESSURE: 59 MMHG | BODY MASS INDEX: 29.41 KG/M2 | SYSTOLIC BLOOD PRESSURE: 116 MMHG | HEART RATE: 74 BPM

## 2023-03-23 DIAGNOSIS — F12.90 MARIJUANA USE DURING PREGNANCY: ICD-10-CM

## 2023-03-23 DIAGNOSIS — Z3A.24 24 WEEKS GESTATION OF PREGNANCY: Primary | ICD-10-CM

## 2023-03-23 DIAGNOSIS — O99.320 MARIJUANA USE DURING PREGNANCY: ICD-10-CM

## 2023-03-23 DIAGNOSIS — K21.9 GASTROESOPHAGEAL REFLUX DURING PREGNANCY IN SECOND TRIMESTER, ANTEPARTUM: ICD-10-CM

## 2023-03-23 DIAGNOSIS — J45.909 ASTHMA COMPLICATING PREGNANCY IN SECOND TRIMESTER: ICD-10-CM

## 2023-03-23 DIAGNOSIS — O99.512 ASTHMA COMPLICATING PREGNANCY IN SECOND TRIMESTER: ICD-10-CM

## 2023-03-23 DIAGNOSIS — O12.12: ICD-10-CM

## 2023-03-23 DIAGNOSIS — O99.612 GASTROESOPHAGEAL REFLUX DURING PREGNANCY IN SECOND TRIMESTER, ANTEPARTUM: ICD-10-CM

## 2023-03-24 ENCOUNTER — ULTRASOUND (OUTPATIENT)
Dept: PERINATAL CARE | Facility: CLINIC | Age: 18
End: 2023-03-24

## 2023-03-24 VITALS
HEIGHT: 63 IN | DIASTOLIC BLOOD PRESSURE: 60 MMHG | BODY MASS INDEX: 29.23 KG/M2 | WEIGHT: 165 LBS | HEART RATE: 87 BPM | SYSTOLIC BLOOD PRESSURE: 112 MMHG

## 2023-03-24 DIAGNOSIS — Z3A.24 24 WEEKS GESTATION OF PREGNANCY: ICD-10-CM

## 2023-03-24 DIAGNOSIS — J45.909 ASTHMA COMPLICATING PREGNANCY IN SECOND TRIMESTER: Primary | ICD-10-CM

## 2023-03-24 DIAGNOSIS — Z36.2 ENCOUNTER FOR FOLLOW-UP ULTRASOUND OF FETAL ANATOMY: ICD-10-CM

## 2023-03-24 DIAGNOSIS — O99.512 ASTHMA COMPLICATING PREGNANCY IN SECOND TRIMESTER: Primary | ICD-10-CM

## 2023-03-24 NOTE — PROGRESS NOTES
Alem Martin  has no complaints today at 24w6d  She reports fetal movements and does not report any vaginal bleeding or signs of labor  Her recently completed fetal testing revealed a normal NIPT and MSAFP  She is here today for a scan for missed anatomy not seen well on her 20-week ultrasound  Problem list:  1  Maternal asthma-her symptoms are improved since she started the Pulmicort Flexhaler daily and she is now rarely using her albuterol inhaler  2  Her nausea and vomiting improved with the addition of Pepcid 20 mg 30 minutes before breakfast and dinner  She is no longer using Unisom and vitamin B6 but she continues her Zofran although she has not tried to stop her Zofran yet to see if the Pepcid is all she needs  Ultrasound findings: The ultrasound today viewed the prior missed anatomy and no malformations were detected however views of the cranial structures are still limited secondary to fetal position  Pregnancy ultrasound has limitations and is unable to detect all forms of fetal congenital abnormalities  Follow up recommended:   1  Recommend a follow-up ultrasound in 4 weeks for growth and missed anatomy  2  Encouraged her to hold off on using her Zofran to see if curing her reflux also clears her nausea and vomiting that persisted into the second trimester pregnancy  Pre visit time reviewing her records   5 minutes  Face to face time 10 minutes  Post visit time on documentation of note, updating her problem list, adding orders and prescriptions 5 minutes  Procedures that were completed today were charged separately  The level of decision making was low level complexity      Nicholas Farnsworth MD

## 2023-03-24 NOTE — LETTER
March 25, 2023     Jim Hernández MD  [address]    Patient: Félix Christensen   YOB: 2005   Date of Visit: 3/24/2023       Dear Dr Ping Richards: Thank you for referring Torsten Mojica to me for evaluation  Below are my notes for this consultation  If you have questions, please do not hesitate to call me  I look forward to following your patient along with you  Sincerely,        Erlinda Card MD        CC: No Recipients  Erlinda Card MD  3/24/2023 10:57 AM  Sign when Signing Visit  Félix Christensen  has no complaints today at 24w6d  She reports fetal movements and does not report any vaginal bleeding or signs of labor  Her recently completed fetal testing revealed a normal NIPT and MSAFP  She is here today for a scan for missed anatomy not seen well on her 20-week ultrasound  Problem list:  1  Maternal asthma-her symptoms are improved since she started the Pulmicort Flexhaler daily and she is now rarely using her albuterol inhaler  2  Her nausea and vomiting improved with the addition of Pepcid 20 mg 30 minutes before breakfast and dinner  She no longer using Unisom and vitamin B6 but she continues her Zofran although she has not tried to stop her Zofran yet to see if the Pepcid is all she needs  Ultrasound findings: The ultrasound today viewed the prior missed anatomy and no malformations were detected however views of the cranial structures are still limited secondary to fetal position  Pregnancy ultrasound has limitations and is unable to detect all forms of fetal congenital abnormalities  Follow up recommended:   1  Recommend a follow-up ultrasound in 4 weeks for growth and missed anatomy  2  Encouraged her to hold off on using her Zofran to see if curing her reflux also clears her nausea and vomiting persisted into the second trimester pregnancy      Pre visit time reviewing her records   5 minutes  Face to face time 10 minutes  Post visit time on documentation of note, updating her problem list, adding orders and prescriptions 5 minutes  Procedures that were completed today were charged separately  The level of decision making was low level complexity      Leif Tellez MD

## 2023-04-04 DIAGNOSIS — Z3A.13 13 WEEKS GESTATION OF PREGNANCY: ICD-10-CM

## 2023-04-05 RX ORDER — FERROUS SULFATE TAB EC 324 MG (65 MG FE EQUIVALENT) 324 (65 FE) MG
TABLET DELAYED RESPONSE ORAL
Qty: 90 TABLET | Refills: 1 | Status: SHIPPED | OUTPATIENT
Start: 2023-04-05

## 2023-04-19 PROBLEM — O99.519 ASTHMA DURING PREGNANCY: Status: ACTIVE | Noted: 2023-02-20

## 2023-04-19 PROBLEM — Z3A.28 28 WEEKS GESTATION OF PREGNANCY: Status: ACTIVE | Noted: 2022-12-30

## 2023-04-25 NOTE — PROGRESS NOTES
Aristeo Davidson  was with her grandmother and has no complaints today at 29w2d  She reports fetal movements and does not report any vaginal bleeding or signs of labor  She has not completed her 28-week labs yet  She is here today for an ultrasound for growth and missed anatomy  Ultrasound findings: The ultrasound today shows a fetus that is symmetric and in the 10th percentile with the fetal abdomen in the 5th percentile  BOBBI and umbilical Dopplers and BPP 8 out of 8 are all reassuring  The prior missed anatomy was reviewed and appears normal although views of the cavum are limited secondary to fetal position and should be reexamined with her next growth scan  Pregnancy ultrasound has limitations and is unable to detect all forms of fetal congenital abnormalities  Specific counseling was provided on the following problems:  1  Intrauterine Growth Restriction (IUGR): We reviewed the finding of fetal growth restriction today, which is defined either as estimated fetal weight, OR fetal abdominal circumference <10% ile for gestational age  Possible etiologies include incorrect dates, ultrasound variance, family history of small babies, genetic syndromes, congenital infections, maternal medical comorbidities, and uteroplacental insufficiency  In the third trimester ultrasound estimated weights can be off by 1 lb either way  2  For isolated fetal growth restriction between the 3 to 9% without additional findings, I recommend close outpatient surveillance with weekly NST, and weekly evaluation of amniotic fluid and umbilical artery Dopplers  Fetal growth should be re-assessed every 3 weeks  If the above  surveillance is reassuring, and Doppler indices remain normal, delivery is recommended between 38 0/7 and 39 6/7 weeks gestation   Based on the findings today I would encourage delivery at the end of this interval  Change in umbilical artery Dopplers, non-reassuring antepartum surveillance, or the presence of new obstetric indications may be an indication for sooner delivery  Fetal kick counting is recommended daily  She should be closely observed for symptoms of preeclampsia or gestational hypertension which can be found more often in pregnancies being monitored for fetal growth restriction  3  I reviewed her dating scan at 12 weeks which was based on crown-rump length measurements and other biometric measurements  At this gestational age dating should be just based on crown-rump length which appears to be an accurate picture and gives a new due date of 7/10/2023  Patient is aware  Follow up recommended:   1  Weekly NST/fluid scans and umbilical Dopplers  2  Repeat growth scan in 3 weeks  3  Recommend she complete her 28 week labs that are ordered  Pre visit time reviewing her records   10 minutes  Face to face time 15 minutes  Post visit time on documentation of note, updating her problem list, adding orders and prescriptions 10 minutes  Procedures that were completed today were charged separately  The level of decision making was moderate complexity      Camacho Robertson MD

## 2023-04-25 NOTE — PATIENT INSTRUCTIONS
Thank you for choosing us for your  care today  If you have any questions about your ultrasound or care, please do not hesitate to contact us or your primary obstetrician  Some general instructions for your pregnancy are:    Exercise: Aim for 22 minutes per day (150 minutes per week) of regular exercise  Walking is great! Nutrition: Choose healthy sources of calcium, iron, and protein  Learn about Preeclampsia: preeclampsia is a common, serious high blood pressure complication in pregnancy  A blood pressure of 418NYTU (systolic or top number) or 10GTBJ (diastolic or bottom number) is not normal and needs evaluation by your doctor  Aspirin is sometimes prescribed in early pregnancy to prevent preeclampsia in women with risk factors - ask your obstetrician if you should be on this medication  For more resources, visit:  [employer] fi  If you smoke, try to reduce how many cigarettes you smoke or try to quit completely  Do not vape  Other warning signs to watch out for in pregnancy or postpartum: chest pain, obstructed breathing or shortness of breath, seizures, thoughts of hurting yourself or your baby, bleeding, a painful or swollen leg, fever, or headache (see [facility] POST-BIRTH Warning Signs campaign)  If these happen call 911  Itching is also not normal in pregnancy and if you experience this, especially over your hands and feet, potentially worse at night, notify your doctors

## 2023-04-26 ENCOUNTER — ULTRASOUND (OUTPATIENT)
Facility: HOSPITAL | Age: 18
End: 2023-04-26

## 2023-04-26 VITALS
HEIGHT: 63 IN | HEART RATE: 99 BPM | DIASTOLIC BLOOD PRESSURE: 60 MMHG | BODY MASS INDEX: 29.02 KG/M2 | SYSTOLIC BLOOD PRESSURE: 110 MMHG | WEIGHT: 163.8 LBS

## 2023-04-26 DIAGNOSIS — Z3A.29 29 WEEKS GESTATION OF PREGNANCY: ICD-10-CM

## 2023-04-26 DIAGNOSIS — Z36.2 ENCOUNTER FOR FOLLOW-UP ULTRASOUND OF FETAL ANATOMY: ICD-10-CM

## 2023-04-26 DIAGNOSIS — Z36.89 ENCOUNTER FOR ULTRASOUND TO ASSESS FETAL GROWTH: ICD-10-CM

## 2023-04-26 DIAGNOSIS — O36.5930 POOR FETAL GROWTH AFFECTING MANAGEMENT OF MOTHER IN THIRD TRIMESTER, SINGLE OR UNSPECIFIED FETUS: Primary | ICD-10-CM

## 2023-04-26 NOTE — Clinical Note
Please contact this patient in the morning to set up weekly nonstress tests, fluid scans and umbilical Dopplers and a growth scan in 3 weeks   Thanks Yanira Evangelista

## 2023-04-26 NOTE — LETTER
April 26, 2023     Neena Daugherty MD  [address]    Patient: Richardson Jauregui   YOB: 2005   Date of Visit: 4/26/2023       Dear Dr Joe Parada: Thank you for referring Ken Boss to me for evaluation  Below are my notes for this consultation  If you have questions, please do not hesitate to call me  I look forward to following your patient along with you  Sincerely,        Santino Denise MD        CC: No Recipients  Santino Denise MD  4/26/2023  6:32 PM  Sign when Signing Visit    Richardson Jauregui  was with her grandmother and has no complaints today at 29w2d  She reports fetal movements and does not report any vaginal bleeding or signs of labor  She has not completed her 28-week labs yet  She is here today for an ultrasound for growth and missed anatomy  Ultrasound findings: The ultrasound today shows a fetus that is symmetric and in the 10th percentile with the fetal abdomen in the 5th percentile  BOBBI and umbilical Dopplers and BPP 8 out of 8 are all reassuring  The prior missed anatomy was reviewed and appears normal although views of the cavum are limited secondary to fetal position and should be reexamined with her next growth scan  Pregnancy ultrasound has limitations and is unable to detect all forms of fetal congenital abnormalities  Specific counseling was provided on the following problems:  1  Intrauterine Growth Restriction (IUGR): We reviewed the finding of fetal growth restriction today, which is defined either as estimated fetal weight, OR fetal abdominal circumference <10% ile for gestational age  Possible etiologies include incorrect dates, ultrasound variance, family history of small babies, genetic syndromes, congenital infections, maternal medical comorbidities, and uteroplacental insufficiency  In the third trimester ultrasound estimated weights can be off by 1 lb either way     2  For isolated fetal growth restriction between the 3 to 9% without additional findings, I recommend close outpatient surveillance with weekly NST, and weekly evaluation of amniotic fluid and umbilical artery Dopplers  Fetal growth should be re-assessed every 3 weeks  If the above  surveillance is reassuring, and Doppler indices remain normal, delivery is recommended between 38 0/7 and 39 6/7 weeks gestation  Based on the findings today I would encourage delivery at the end of this interval  Change in umbilical artery Dopplers, non-reassuring antepartum surveillance, or the presence of new obstetric indications may be an indication for sooner delivery  Fetal kick counting is recommended daily  She should be closely observed for symptoms of preeclampsia or gestational hypertension which can be found more often in pregnancies being monitored for fetal growth restriction  3  I reviewed her dating scan at 12 weeks which was based on crown-rump length measurements and other biometric measurements  At this gestational age dating should be just based on crown-rump length which appears to be an accurate picture and gives a new due date of 7/10/2023  Patient is aware  Follow up recommended:   1  Weekly NST/fluid scans and umbilical Dopplers  2  Repeat growth scan in 3 weeks  3  Recommend she complete her 28 week labs that are ordered  Pre visit time reviewing her records   10 minutes  Face to face time 15 minutes  Post visit time on documentation of note, updating her problem list, adding orders and prescriptions 10 minutes  Procedures that were completed today were charged separately  The level of decision making was moderate complexity      Jayla Garrido MD

## 2023-04-27 ENCOUNTER — TELEPHONE (OUTPATIENT)
Dept: PERINATAL CARE | Facility: OTHER | Age: 18
End: 2023-04-27

## 2023-04-27 NOTE — TELEPHONE ENCOUNTER
Spoke with patient 4/27 at 1181 with the recommendations for weekly testing/dopplers and growth in 3 weeks  Patient stated she would call back to schedule appointments and was made aware of the numbers to contact      ----- Message from Yung Mcduffie MD sent at 4/26/2023  6:33 PM EDT -----  Please contact this patient in the morning to set up weekly nonstress tests, fluid scans and umbilical Dopplers and a growth scan in 3 weeks    Thanks  Laura Malone

## 2023-05-03 PROBLEM — Z3A.30 30 WEEKS GESTATION OF PREGNANCY: Status: ACTIVE | Noted: 2022-12-30

## 2023-05-22 ENCOUNTER — ROUTINE PRENATAL (OUTPATIENT)
Dept: OBGYN CLINIC | Facility: CLINIC | Age: 18
End: 2023-05-22

## 2023-05-22 VITALS
HEIGHT: 63 IN | BODY MASS INDEX: 30.3 KG/M2 | WEIGHT: 171 LBS | HEART RATE: 97 BPM | DIASTOLIC BLOOD PRESSURE: 57 MMHG | SYSTOLIC BLOOD PRESSURE: 109 MMHG

## 2023-05-22 DIAGNOSIS — L29.9 PRURITUS OF PREGNANCY IN THIRD TRIMESTER: ICD-10-CM

## 2023-05-22 DIAGNOSIS — Z3A.33 33 WEEKS GESTATION OF PREGNANCY: ICD-10-CM

## 2023-05-22 DIAGNOSIS — Z34.93 PRENATAL CARE IN THIRD TRIMESTER: Primary | ICD-10-CM

## 2023-05-22 DIAGNOSIS — O99.713 PRURITUS OF PREGNANCY IN THIRD TRIMESTER: ICD-10-CM

## 2023-05-22 RX ORDER — ONDANSETRON 4 MG/1
4 TABLET, FILM COATED ORAL EVERY 8 HOURS PRN
Qty: 20 TABLET | Refills: 0 | Status: SHIPPED | OUTPATIENT
Start: 2023-05-22

## 2023-05-22 NOTE — PROGRESS NOTES
Senthil Umaña presents today for routine OB visit at 33w0d  She is a   Today she reports that over the past week or two she has noticed intermittent itching on her hands and forearms, occasionally has itching on her feet  Itching seems to happen at night and is usually improved after using a moisturizer  Blood Pressure: 109/57  Wt=77 6 kg (171 lb); Body mass index is 30 29 kg/m² ; TWG=4 99 kg (11 lb)  Fetal Heart Rate: 140; Fundal Height (cm): 30 cm  Abdomen: gravid, soft, non-tender  Denies uterine contractions  Denies vaginal bleeding or leaking of fluid  Reports adequate fetal movement of at least 10 movements in 2 hours once daily  Patient has not yet completed repeat growth scans or any weekly  testing for possible asymmetric FGR  Call placed to [facility]  today to assist patient in scheduling follow up  She is scheduled with [facility]  for this week on 23  She has not yet completed 28 week labs  Discussed importance of this  Orders added for bile acids and CMP due to new onset of itching  Discussed importance of completing all lab work, states she plans to go to the lab later today  Reviewed premature labor precautions and fetal kick counts  Advised to continue medications and return in 2 weeks  Current Outpatient Medications   Medication Instructions   • albuterol (PROVENTIL HFA,VENTOLIN HFA) 90 mcg/act inhaler TAKE 2 PUFFS BY MOUTH EVERY 6 HOURS AS NEEDED FOR WHEEZE OR FOR SHORTNESS OF BREATH   • budesonide (Pulmicort Flexhaler) 90 MCG/ACT inhaler 1 puff, Inhalation, 2 times daily, Rinse mouth after use     • famotidine (PEPCID) 20 mg, Oral, 2 times daily   • ferrous sulfate 324 (65 Fe) mg TAKE 1 TABLET BY MOUTH 2 TIMES A DAY BEFORE MEALS   • ondansetron (ZOFRAN) 4 mg, Oral, Every 8 hours PRN   • prenatal vitamin (CLASSIC FORMULA) 27-0 8 mg 1 tablet, Oral, Daily         Pregnancy Problems (from 22 to present)     Problem Noted Resolved    Marijuana use during pregnancy 2/20/2023 by Eden Hoffman MD No    Overview Signed 2/20/2023  9:01 PM by Eden Hoffman MD     Using for depression and her hyperemesis-if she she cannot stop recommend she obtain a medical marijuana card and avoid buying illicit drug         Proteinuria affecting pregnancy, antepartum, second trimester 2/20/2023 by Eden Hoffman MD No    Overview Addendum 3/24/2023 10:58 AM by Eden Hoffman MD     PC ratio normal on 2/20/23  Looks like she develops proteinuria in response to dehydration and positive ketones and this resolves when she is no longer dehydrated  Gastroesophageal reflux during pregnancy in second trimester, antepartum 2/20/2023 by Eden Hoffman MD No    Overview Addendum 3/23/2023 11:13 AM by Casey Parson MD     Improved with pepcid         Asthma during pregnancy 2/20/2023 by Eden Hoffman MD No    Overview Addendum 4/20/2023 11:11 AM by Casey Parson MD     Improved since starting inhaled steroid  Rarely uses her albuterol         33 weeks gestation of pregnancy 12/30/2022 by Casey Parson MD No    Overview Addendum 5/22/2023  8:50 AM by NELSON Guillermo     Dated via 1st trimester US on 12/30/22 -> JESSICA 7/8/2023  - Doing well today  - Continue prenatal vitamin  - Prenatal labs: wnl  - Initial OB U/S: wnl  - MSAFP and NIPT- low risk  - Level 2 U/S: wnl -> f/up growth scheduled 4/26  - 28 week labs [ ] ordered, not yet done  - Tdap given 4/20  - Delivery consent signed 4/20  - Contraception plan: Depo Provera  - 5/22/23 patient reports itching on hands, forearms and occasionally feet  Orders placed for bile acids and CMP  -4/26/23 29w2d: Vertex  Fundal placenta  Possible asymmetric FGR  The ultrasound today shows a fetus that is symmetric and in the 10th percentile with the fetal abdomen in the 5th percentile  Weekly NST/fluid scans and umbilical Dopplers  Repeat growth scan in 3 weeks     -5/22/23 Patient has not yet completed repeat growth scans or any weekly  testing  Call placed to [facility]  today to assist patient in scheduling follow up  She is scheduled with [facility]  for this week on 23

## 2023-05-24 PROBLEM — O36.5990 FETAL GROWTH RESTRICTION ANTEPARTUM: Status: ACTIVE | Noted: 2023-05-24

## 2023-05-25 ENCOUNTER — ULTRASOUND (OUTPATIENT)
Dept: PERINATAL CARE | Facility: CLINIC | Age: 18
End: 2023-05-25

## 2023-05-25 ENCOUNTER — APPOINTMENT (OUTPATIENT)
Dept: LAB | Facility: CLINIC | Age: 18
End: 2023-05-25

## 2023-05-25 VITALS
SYSTOLIC BLOOD PRESSURE: 100 MMHG | HEIGHT: 63 IN | BODY MASS INDEX: 30.19 KG/M2 | DIASTOLIC BLOOD PRESSURE: 58 MMHG | WEIGHT: 170.4 LBS | HEART RATE: 98 BPM

## 2023-05-25 DIAGNOSIS — Z3A.33 33 WEEKS GESTATION OF PREGNANCY: ICD-10-CM

## 2023-05-25 DIAGNOSIS — J45.20 MILD INTERMITTENT ASTHMA, UNSPECIFIED WHETHER COMPLICATED: ICD-10-CM

## 2023-05-25 DIAGNOSIS — O99.713 PRURITUS OF PREGNANCY IN THIRD TRIMESTER: ICD-10-CM

## 2023-05-25 DIAGNOSIS — Z3A.24 24 WEEKS GESTATION OF PREGNANCY: ICD-10-CM

## 2023-05-25 DIAGNOSIS — L29.9 PRURITUS OF PREGNANCY IN THIRD TRIMESTER: ICD-10-CM

## 2023-05-25 DIAGNOSIS — Z36.89 ENCOUNTER FOR ULTRASOUND TO ASSESS FETAL GROWTH: Primary | ICD-10-CM

## 2023-05-25 DIAGNOSIS — O36.5990 FETAL GROWTH RESTRICTION ANTEPARTUM: ICD-10-CM

## 2023-05-25 DIAGNOSIS — Z34.93 PRENATAL CARE IN THIRD TRIMESTER: ICD-10-CM

## 2023-05-25 LAB
ALBUMIN SERPL BCP-MCNC: 2.9 G/DL (ref 3.5–5)
ALP SERPL-CCNC: 85 U/L (ref 46–116)
ALT SERPL W P-5'-P-CCNC: 18 U/L (ref 12–78)
ANION GAP SERPL CALCULATED.3IONS-SCNC: 3 MMOL/L (ref 4–13)
AST SERPL W P-5'-P-CCNC: 18 U/L (ref 5–45)
BILIRUB SERPL-MCNC: 0.44 MG/DL (ref 0.2–1)
BUN SERPL-MCNC: 6 MG/DL (ref 5–25)
CALCIUM ALBUM COR SERPL-MCNC: 10.6 MG/DL (ref 8.3–10.1)
CALCIUM SERPL-MCNC: 9.7 MG/DL (ref 8.3–10.1)
CHLORIDE SERPL-SCNC: 105 MMOL/L (ref 96–108)
CO2 SERPL-SCNC: 23 MMOL/L (ref 21–32)
CREAT SERPL-MCNC: 0.6 MG/DL (ref 0.6–1.3)
ERYTHROCYTE [DISTWIDTH] IN BLOOD BY AUTOMATED COUNT: 13.4 % (ref 11.6–15.1)
GFR SERPL CREATININE-BSD FRML MDRD: 131 ML/MIN/1.73SQ M
GLUCOSE 1H P 50 G GLC PO SERPL-MCNC: 102 MG/DL (ref 40–134)
GLUCOSE SERPL-MCNC: 102 MG/DL (ref 65–140)
HCT VFR BLD AUTO: 34.7 % (ref 34.8–46.1)
HGB BLD-MCNC: 11.6 G/DL (ref 11.5–15.4)
MCH RBC QN AUTO: 30.4 PG (ref 26.8–34.3)
MCHC RBC AUTO-ENTMCNC: 33.4 G/DL (ref 31.4–37.4)
MCV RBC AUTO: 91 FL (ref 82–98)
PLATELET # BLD AUTO: 273 THOUSANDS/UL (ref 149–390)
PMV BLD AUTO: 10.3 FL (ref 8.9–12.7)
POTASSIUM SERPL-SCNC: 3.6 MMOL/L (ref 3.5–5.3)
PROT SERPL-MCNC: 7.2 G/DL (ref 6.4–8.4)
RBC # BLD AUTO: 3.82 MILLION/UL (ref 3.81–5.12)
SODIUM SERPL-SCNC: 131 MMOL/L (ref 135–147)
TREPONEMA PALLIDUM IGG+IGM AB [PRESENCE] IN SERUM OR PLASMA BY IMMUNOASSAY: NORMAL
WBC # BLD AUTO: 10.42 THOUSAND/UL (ref 4.31–10.16)

## 2023-05-25 RX ORDER — ALBUTEROL SULFATE 90 UG/1
AEROSOL, METERED RESPIRATORY (INHALATION)
Qty: 8.5 G | Refills: 0 | Status: CANCELLED | OUTPATIENT
Start: 2023-05-25

## 2023-05-25 NOTE — PROGRESS NOTES
Non-Stress Testing:    Non-Stress test, equipment, procedure, and expected outcomes reviewed  Reviewed fetal kick counts and when to call OB  Elvie Carter Verified patient understanding of fetal kick counts with teach back method  Patient reports feeling daily fetal movements  Patient has no questions or concerns

## 2023-05-25 NOTE — LETTER
May 25, 2023     Charlee Lees MD  [address]    Patient: Roxana Hall   YOB: 2005   Date of Visit: 2023       Dear Dr Viktoria Tracy: Thank you for referring Bello Wise to me for evaluation  Below are my notes for this consultation  If you have questions, please do not hesitate to call me  I look forward to following your patient along with you           Sincerely,         [facility] [place]        CC: No Recipients

## 2023-05-27 LAB — BILE AC SERPL-SCNC: 1.9 UMOL/L (ref 0–10)

## 2023-05-31 NOTE — PATIENT INSTRUCTIONS
Thank you for choosing us for your  care today  If you have any questions about your ultrasound or care, please do not hesitate to contact us or your primary obstetrician  Some general instructions for your pregnancy are:    Exercise: Aim for 22 minutes per day (150 minutes per week) of regular exercise  Walking is great! Nutrition: Choose healthy sources of calcium, iron, and protein  Learn about Preeclampsia: preeclampsia is a common, serious high blood pressure complication in pregnancy  A blood pressure of 903JMSD (systolic or top number) or 45CAKD (diastolic or bottom number) is not normal and needs evaluation by your doctor  Aspirin is sometimes prescribed in early pregnancy to prevent preeclampsia in women with risk factors - ask your obstetrician if you should be on this medication  For more resources, visit:  [employer] fi  If you smoke, try to reduce how many cigarettes you smoke or try to quit completely  Do not vape  Other warning signs to watch out for in pregnancy or postpartum: chest pain, obstructed breathing or shortness of breath, seizures, thoughts of hurting yourself or your baby, bleeding, a painful or swollen leg, fever, or headache (see [employer] POST-BIRTH Warning Signs campaign)  If these happen call 911  Itching is also not normal in pregnancy and if you experience this, especially over your hands and feet, potentially worse at night, notify your doctors  Kick Counts in Pregnancy   WHAT YOU NEED TO KNOW:   What do I need to know about kick counts? Kick counts measure how much your baby is moving in your womb  A kick from your baby can be felt as a twist, turn, swish, roll, or jab  It is common to feel your baby kicking at 26 to 28 weeks of pregnancy  You may feel your baby kick as early as 20 weeks of pregnancy  You may want to start counting at 28 weeks  Why should I measure kick counts?   Your baby's movement may provide information about your baby's health  He or she may move less, or not at all, if there are problems  Your baby may move less if he or she is not getting enough oxygen or nutrition from the placenta  Do not smoke while you are pregnant  Smoking decreases the amount of oxygen that gets to your baby  Talk to your healthcare provider if you need help to quit smoking  Tell your healthcare provider as soon as you feel a change in your baby's movements  When do I measure kick counts? Measure kick counts at the same time every day  Measure kick counts when your baby is awake and most active  Your baby may be most active in the evening  How do I measure kick counts? Check that your baby is awake before you measure kick counts  You can wake up your baby by lightly pushing on your belly, walking, or drinking something cold  Your healthcare provider may tell you different ways to measure kick counts  You may be told to do the following:  Use a chart or clock to keep track of the time you start and finish counting  Sit in a chair or lie on your left side  Place your hands on the largest part of your belly  Count until you reach 10 kicks  Write down how much time it takes to count 10 kicks  It may take 30 minutes to 2 hours to count 10 kicks  It should not take more than 2 hours to count 10 kicks  When should I contact my doctor? You feel a change in the number of kicks or movements of your baby  You feel fewer than 10 kicks within 2 hours  You have questions or concerns about your baby's movements  CARE AGREEMENT:   You have the right to help plan your care  Learn about your health condition and how it may be treated  Discuss treatment options with your healthcare providers to decide what care you want to receive  You always have the right to refuse treatment  The above information is an  only   It is not intended as medical advice for individual conditions or treatments  Talk to your doctor, nurse or pharmacist before following any medical regimen to see if it is safe and effective for you  © Copyright [facility] 2022 Information is for End User's use only and may not be sold, redistributed or otherwise used for commercial purposes

## 2023-06-01 ENCOUNTER — TELEPHONE (OUTPATIENT)
Dept: OBGYN CLINIC | Facility: CLINIC | Age: 18
End: 2023-06-01

## 2023-06-01 ENCOUNTER — ULTRASOUND (OUTPATIENT)
Dept: PERINATAL CARE | Facility: CLINIC | Age: 18
End: 2023-06-01

## 2023-06-01 VITALS
DIASTOLIC BLOOD PRESSURE: 68 MMHG | BODY MASS INDEX: 31.54 KG/M2 | WEIGHT: 178 LBS | HEIGHT: 63 IN | SYSTOLIC BLOOD PRESSURE: 104 MMHG | HEART RATE: 84 BPM

## 2023-06-01 DIAGNOSIS — O36.5990 FETAL GROWTH RESTRICTION ANTEPARTUM: Primary | ICD-10-CM

## 2023-06-01 DIAGNOSIS — Z3A.34 34 WEEKS GESTATION OF PREGNANCY: ICD-10-CM

## 2023-06-01 NOTE — TELEPHONE ENCOUNTER
----- Message from Mary Kay John, [address] sent at 6/1/2023  1:39 PM EDT -----  Please let her know that she passed her glucose test  Lab work that was done due to itching was normal and does not show cholestasis  Thank you!

## 2023-06-01 NOTE — PROGRESS NOTES
"[address] [address]: Ms Indira Nam was seen today at 70W8F for umbilical artery Doppler study, BOBBI, and NST  See ultrasound report under \"OB Procedures\" tab  She reports good fetal movement and has no problems or concerns today  Please don't hesitate to contact our office with any concerns or questions   -NELSON Marin      I spent 10 minutes devoted to patient care (3 min chart preparation, 4 minutes face to face and 3 minutes documenting)        "

## 2023-06-08 ENCOUNTER — HOSPITAL ENCOUNTER (OUTPATIENT)
Facility: HOSPITAL | Age: 18
Discharge: HOME | End: 2023-06-08
Attending: OBSTETRICS & GYNECOLOGY | Admitting: OBSTETRICS & GYNECOLOGY
Payer: MEDICARE

## 2023-06-08 ENCOUNTER — ROUTINE PRENATAL (OUTPATIENT)
Dept: OBGYN CLINIC | Facility: CLINIC | Age: 18
End: 2023-06-08

## 2023-06-08 VITALS
HEIGHT: 63 IN | BODY MASS INDEX: 30.12 KG/M2 | HEART RATE: 82 BPM | WEIGHT: 170 LBS | SYSTOLIC BLOOD PRESSURE: 110 MMHG | DIASTOLIC BLOOD PRESSURE: 62 MMHG

## 2023-06-08 VITALS — OXYGEN SATURATION: 100 % | DIASTOLIC BLOOD PRESSURE: 62 MMHG | SYSTOLIC BLOOD PRESSURE: 105 MMHG | HEART RATE: 84 BPM

## 2023-06-08 DIAGNOSIS — O36.5990 FETAL GROWTH RESTRICTION ANTEPARTUM: ICD-10-CM

## 2023-06-08 DIAGNOSIS — F12.90 MARIJUANA USE DURING PREGNANCY: ICD-10-CM

## 2023-06-08 DIAGNOSIS — Z3A.35 35 WEEKS GESTATION OF PREGNANCY: ICD-10-CM

## 2023-06-08 DIAGNOSIS — O99.320 MARIJUANA USE DURING PREGNANCY: ICD-10-CM

## 2023-06-08 DIAGNOSIS — Z34.93 PRENATAL CARE IN THIRD TRIMESTER: Primary | ICD-10-CM

## 2023-06-08 PROCEDURE — 87150 DNA/RNA AMPLIFIED PROBE: CPT

## 2023-06-08 PROCEDURE — 76815 OB US LIMITED FETUS(S): CPT | Performed by: OBSTETRICS & GYNECOLOGY

## 2023-06-08 PROCEDURE — NC001 PR NO CHARGE: Performed by: OBSTETRICS & GYNECOLOGY

## 2023-06-08 PROCEDURE — 99213 OFFICE O/P EST LOW 20 MIN: CPT

## 2023-06-08 PROCEDURE — 99213 OFFICE O/P EST LOW 20 MIN: CPT | Performed by: OBSTETRICS & GYNECOLOGY

## 2023-06-08 NOTE — PROGRESS NOTES
"[address] [facility]  [id number]  2005        A/P:  Problem List        Digestive    Gastroesophageal reflux during pregnancy in second trimester, antepartum    Overview     Improved with pepcid            Respiratory    Asthma during pregnancy    Overview     Improved since starting inhaled steroid -> rarely uses her albuterol            Musculoskeletal and Integument    Acne       Other    35 weeks gestation of pregnancy    Overview     Dated via 1st trimester US on 12/30/22 -> JESSICA 7/8/2023  - Doing well today  - Continue prenatal vitamin  - Prenatal labs: wnl  - Initial OB U/S: wnl  - MSAFP and NIPT- low risk  - Level 2 U/S: wnl  - 28 week labs wnl  - Tdap given 4/20  - Delivery consent signed 4/20  - Contraception plan: Depo Provera  - 5/22/23 patient reports itching on hands, forearms and occasionally feet  Bile acids and CMP wnl  -GBS collected 6/8/23    Ultrasounds  4/26/23 at 29w2d: Vertex  Fundal placenta  Possible asymmetric FGR  The ultrasound today shows a fetus that is symmetric and in the 10th percentile with the fetal abdomen in the 5th percentile  Weekly NST/fluid scans and umbilical Dopplers  5/25/23 at 33w3d: persistent mild FGR, EFW 5%, AC and BPD <3%, normal fluid and dopplers, recommend weekly NST+BOBBI+Umbilical Artery Doppler  Fetal growth to be re-evaluated in three weeks  Perineal abscess    Overview     S/p I&D on 12/26/22  Reports residual \"lump\" but no longer having pain         Marijuana use during pregnancy    Overview     Using for depression and her hyperemesis-if she she cannot stop recommend she obtain a medical marijuana card and avoid buying illicit drug         Proteinuria affecting pregnancy, antepartum, second trimester    Overview     PC ratio normal on 2/20/23  Looks like she develops proteinuria in response to dehydration and positive ketones and this resolves when she is no longer dehydrated            Fetal " growth restriction antepartum    Overview     EFW 5% on    Repeat growth scheduled for     Audible deceleration to the 100s on doppler today in the office, baseline around 125-130bpm  Patient also reporting DFM for the last couple days and did not have her NST done earlier this week  Advised to go to L&D for extended monitoring and BOBBI, warned about admission or possible delivery if not reassuring                S: 19 y o   35w3d here for PN visit  She denies contractions  She denies leakage of fluid and vaginal bleeding  She does report feeling decreased fetal movement over the past couple of days and was unable to have her NST completed earlier this week  O:  Vitals:    23 1100   BP: 110/62   Pulse: 82     Physical Exam  GEN: The patient was alert and oriented x3, pleasant well-appearing female in no acute distress     CV: Regular rate  PULM: Non-labored respirations  MSK: Normal gait  Skin: Warm, dry  Neuro: No focal deficits  Psych: Normal affect and judgement, cooperative    Fetal Heart Rate: 125   SVE 1-2/50/-3    D/w Dr Annalisa Trevizo MD  OB/GYN PGY-2  2023  12:32 PM

## 2023-06-08 NOTE — PROGRESS NOTES
Obstetrics Triage  Yamilet Altman 19 y o  female MRN: [id number]  Unit/Bed#: [facility]  Encounter: [id number]      Assessment/Plan:  19 y o   at 35w3d with fetal growth restriction and concern for deceleration by doppler in the office today s/p prolonged fetal monitoring which was reactive  MVP today is 4cm  Discharge instructions  Patient instructed to call if experiencing worsening contractions, vaginal bleeding, loss of fluid or decreased fetal movement  She will follow up with MFM on 23 and her OBGYN on 6/15/2023  Plan of care discussed with Dr Desiree Lynch  Subjective:  Estimated Date of Delivery: 7/10/23    HPI Chronology:  19 y o   at 35w3d presents from the office for fetal monitoring due to concern for fetal heart rate deceleration by doppler  She has no OB complaints and denies contractions, loss of fluid and vaginal bleeding  Fetal movement is present  Review of Systems  12-point ROS negative unless stated in the HPI  Objective:  Vitals:   /62   Pulse 84   SpO2 100%   There is no height or weight on file to calculate BMI  Physical Exam  GEN:  alert and oriented x 3, no apparent distress, appears well  PULMONARY: normal effort, no distress    FETAL ASSESSMENT:  FHT: Baseline Rate: 120 bpm  Variability: Moderate 6-25 bpm  Accelerations: 15 x 15 or greater  Decelerations: None  FHR Category: Category I  TOCO: Contraction Frequency (minutes): 0  Contraction Quality: Not applicable      Lab, Imaging and other studies: I have personally reviewed pertinent reports  Isatu Solitario MD  PGY-III, OB/GYN  2023, 3:22 PM

## 2023-06-08 NOTE — PROCEDURES
Latanya Brewster, a  at 35w3d with an JESSICA of 7/10/2023, by Ultrasound, was seen at [address] for the following procedure(s): $Procedure Type: BOBBI]        4 Quadrant BOBBI  LVP (cm): 4 1 cm        Fetus cephalic  Isatu Correa MD  PGY-III, OB/GYN  2023, 3:14 PM

## 2023-06-12 ENCOUNTER — ULTRASOUND (OUTPATIENT)
Dept: PERINATAL CARE | Facility: CLINIC | Age: 18
End: 2023-06-12
Payer: MEDICARE

## 2023-06-12 ENCOUNTER — TELEPHONE (OUTPATIENT)
Dept: PERINATAL CARE | Facility: CLINIC | Age: 18
End: 2023-06-12

## 2023-06-12 VITALS
WEIGHT: 184 LBS | DIASTOLIC BLOOD PRESSURE: 60 MMHG | HEART RATE: 74 BPM | SYSTOLIC BLOOD PRESSURE: 100 MMHG | BODY MASS INDEX: 32.6 KG/M2 | HEIGHT: 63 IN

## 2023-06-12 DIAGNOSIS — Z3A.36 36 WEEKS GESTATION OF PREGNANCY: Primary | ICD-10-CM

## 2023-06-12 DIAGNOSIS — O36.5930 POOR FETAL GROWTH AFFECTING MANAGEMENT OF MOTHER IN THIRD TRIMESTER, SINGLE OR UNSPECIFIED FETUS: ICD-10-CM

## 2023-06-12 LAB — GP B STREP DNA SPEC QL NAA+PROBE: NEGATIVE

## 2023-06-12 PROCEDURE — 76818 FETAL BIOPHYS PROFILE W/NST: CPT | Performed by: OBSTETRICS & GYNECOLOGY

## 2023-06-12 PROCEDURE — 76820 UMBILICAL ARTERY ECHO: CPT | Performed by: OBSTETRICS & GYNECOLOGY

## 2023-06-12 PROCEDURE — 99214 OFFICE O/P EST MOD 30 MIN: CPT | Performed by: OBSTETRICS & GYNECOLOGY

## 2023-06-12 PROCEDURE — 76816 OB US FOLLOW-UP PER FETUS: CPT | Performed by: OBSTETRICS & GYNECOLOGY

## 2023-06-12 NOTE — PATIENT INSTRUCTIONS
Kick Counts in Pregnancy   WHAT YOU NEED TO KNOW:   What do I need to know about kick counts? Kick counts measure how much your baby is moving in your womb  A kick from your baby can be felt as a twist, turn, swish, roll, or jab  It is common to feel your baby kicking at 26 to 28 weeks of pregnancy  You may feel your baby kick as early as 20 weeks of pregnancy  You may want to start counting at 28 weeks  Why should I measure kick counts? Your baby's movement may provide information about your baby's health  He or she may move less, or not at all, if there are problems  Your baby may move less if he or she is not getting enough oxygen or nutrition from the placenta  Do not smoke while you are pregnant  Smoking decreases the amount of oxygen that gets to your baby  Talk to your healthcare provider if you need help to quit smoking  Tell your healthcare provider as soon as you feel a change in your baby's movements  When do I measure kick counts? Measure kick counts at the same time every day  Measure kick counts when your baby is awake and most active  Your baby may be most active in the evening  How do I measure kick counts? Check that your baby is awake before you measure kick counts  You can wake up your baby by lightly pushing on your belly, walking, or drinking something cold  Your healthcare provider may tell you different ways to measure kick counts  You may be told to do the following:  Use a chart or clock to keep track of the time you start and finish counting  Sit in a chair or lie on your left side  Place your hands on the largest part of your belly  Count until you reach 10 kicks  Write down how much time it takes to count 10 kicks  It may take 30 minutes to 2 hours to count 10 kicks  It should not take more than 2 hours to count 10 kicks  When should I contact my doctor? You feel a change in the number of kicks or movements of your baby        You feel fewer than 10 kicks within 2 hours  You have questions or concerns about your baby's movements  CARE AGREEMENT:   You have the right to help plan your care  Learn about your health condition and how it may be treated  Discuss treatment options with your healthcare providers to decide what care you want to receive  You always have the right to refuse treatment  The above information is an  only  It is not intended as medical advice for individual conditions or treatments  Talk to your doctor, nurse or pharmacist before following any medical regimen to see if it is safe and effective for you  © Copyright [employer] 2022 Information is for End User's use only and may not be sold, redistributed or otherwise used for commercial purposes   All illustrations and images included in CareNotes® are the copyrighted property of A D A M , Inc  or [address] [place] [address]

## 2023-06-12 NOTE — TELEPHONE ENCOUNTER
Left patient VMM that her M ultrasound appointment this morning will be reviewed by [facility] provider remotely and then the Dr  will call her to discuss results  Explained this was an unexpected change in the availability of the [facility] provider today and provided # for patient to call any back questions

## 2023-06-14 ENCOUNTER — HOSPITAL ENCOUNTER (OUTPATIENT)
Facility: HOSPITAL | Age: 18
Discharge: HOME | End: 2023-06-14
Attending: OBSTETRICS & GYNECOLOGY | Admitting: OBSTETRICS & GYNECOLOGY
Payer: MEDICARE

## 2023-06-14 VITALS
SYSTOLIC BLOOD PRESSURE: 109 MMHG | DIASTOLIC BLOOD PRESSURE: 59 MMHG | HEART RATE: 82 BPM | TEMPERATURE: 98.5 F | RESPIRATION RATE: 16 BRPM

## 2023-06-14 PROBLEM — R10.9 ABDOMINAL PAIN IN PREGNANCY, THIRD TRIMESTER: Status: ACTIVE | Noted: 2023-06-14

## 2023-06-14 PROBLEM — O26.893 ABDOMINAL PAIN IN PREGNANCY, THIRD TRIMESTER: Status: ACTIVE | Noted: 2023-06-14

## 2023-06-14 PROCEDURE — 99214 OFFICE O/P EST MOD 30 MIN: CPT | Performed by: OBSTETRICS & GYNECOLOGY

## 2023-06-14 PROCEDURE — 99214 OFFICE O/P EST MOD 30 MIN: CPT

## 2023-06-14 PROCEDURE — 76815 OB US LIMITED FETUS(S): CPT

## 2023-06-14 NOTE — PROGRESS NOTES
"[id number] [facility]  [id number]  2005        A/P:  Problem List        Digestive    Gastroesophageal reflux during pregnancy in second trimester, antepartum    Overview     Improved with pepcid            Respiratory    Asthma during pregnancy    Overview     Improved since starting inhaled steroid -> rarely uses her albuterol            Musculoskeletal and Integument    Acne       Other    36 weeks gestation of pregnancy    Overview     Dated via 1st trimester US on 12/30/22 -> JESSICA 7/8/2023  - Doing well today  - Continue prenatal vitamin  - Prenatal labs: wnl  - Initial OB U/S: wnl  - MSAFP and NIPT- low risk  - Level 2 U/S: wnl  - 28 week labs wnl  - Tdap given 4/20  - Delivery consent signed 4/20  - Contraception plan: Depo Provera  - 5/22/23 patient reports itching on hands, forearms and occasionally feet  Bile acids and CMP wnl  -GBS negative         Perineal abscess    Overview     S/p I&D on 12/26/22  Reports residual \"lump\" but no longer having pain         Marijuana use during pregnancy    Overview     Using for depression and her hyperemesis-if she she cannot stop recommend she obtain a medical marijuana card and avoid buying illicit drug         Proteinuria affecting pregnancy, antepartum, second trimester    Overview     PC ratio normal on 2/20/23  Looks like she develops proteinuria in response to dehydration and positive ketones and this resolves when she is no longer dehydrated  Poor fetal growth affecting management of mother in third trimester    Overview     Ultrasounds  4/26/23 at 29w2d: Vertex  Fundal placenta  Possible asymmetric FGR  EFW 10th percentile with the fetal abdomen in the 5th percentile  Weekly NST/fluid scans and umbilical Dopplers       5/25/23 at 33w3d: persistent mild FGR, EFW 5%, AC and BPD <3%, normal fluid and dopplers    6/12/23: 35w6d, EFW 4% on 6/12 with normal dopplers     Delivery is recommended at 38 - 39 weeks " in the setting of reassuring   surveillance and Dopplers         Current Assessment & Plan     Continue weekly NST, BOBBI, and dopplers  MFM US scheduled next week         Abdominal pain in pregnancy, third trimester           S: 19 y o   36w3d here for PN visit  She denies contractions  She denies leakage of fluid and vaginal bleeding  She has felt good fetal movement  O:  Vitals:    06/15/23 0700   BP: 101/51   Pulse: 72     Physical Exam  GEN: The patient was alert and oriented x3, pleasant well-appearing female in no acute distress     CV: Regular rate  PULM: Non-labored respirations  MSK: Normal gait  Skin: Warm, dry  Neuro: No focal deficits  Psych: Normal affect and judgement, cooperative    Fetal Heart Rate: 120       D/w Dr Kunal Marie MD  OB/GYN PGY-2  6/15/2023  8:15 AM

## 2023-06-14 NOTE — DISCHARGE INSTR - AVS FIRST PAGE
You have been evaluated for nausea, vomiting, and abdominal pain, along with decreased fetal movement  Your evaluation, including a nonstress test and amniotic fluid evaluation suggests that your baby is healthy and that you are going through expected changes with pregnancy  Call your obstetrician if you notice you are continuing to vomit blood, decreased fetal movement persists, you have any vaginal bleeding, gush of vaginal fluid, worsening headaches, abdominal pain, or nausea  Otherwise, please follow-up with your obstetrician

## 2023-06-14 NOTE — PROGRESS NOTES
Obstetrics Triage  Adilia Richards 19 y o  female MRN: [id number]  Unit/Bed#: LD TRIAGE  Encounter: [id number]      Assessment/Plan:  19 y o   at 37w1d with abd pain + N/V  Patient's symptoms currently controlled upon patient in triage  Patient instructed to use Tylenol and Zofran at home for further symptoms  Baby appears well on NST,  4 cm  No signs of fetal distress, patient does not appear to be in labor  Patient will be instructed to return home and follow-up with her scheduled appointments  Return precautions discussed, all questions answered prior to discharge  Discharge instructions  Patient instructed to call if experiencing worsening contractions, vaginal bleeding, loss of fluid or decreased fetal movement  She will follow up with her OBGYN  Plan of care discussed with Dr Lazarus Hake  Subjective:  Estimated Date of Delivery: 7/10/23    HPI Chronology:  19 y o   at 37w1d presents with abdominal pain, nausea, vomiting starting this morning  Patient has had nausea and vomiting throughout her pregnancy, but it got specifically worse this morning with nausea and vomiting associated with abdominal pain  Patient additionally notes of some red streaks in her first episode of emesis  Patient vomited again in triage for, with no bloody emesis noted  Patient denies any headache, visual change, fever, chest pain  Patient does note that she was short of breath coming to the hospital, has a past medical history of asthma, her shortness of breath responded to 1 dose of her albuterol inhaler  Patient currently feels well, though notes that she thinks she is feeling fewer fetal movements today  Describes her abdominal pain as a kind of uncomfortable pressure felt worst in the left lower quadrant  No radiation  The pain comes and goes, occurs maybe 3 times an hour      Contractions: Patient is not sure if she is having contractions or not  Leakage: none  Bleeding: None  Fetal Movement: Decreased  Pelvic pain: None    Review of Systems  12-point ROS negative unless stated in the HPI  Objective:  Vitals:   /59 (BP Location: Right arm)   Pulse 82   Temp 98 5 °F (36 9 °C) (Oral)   Resp 16   There is no height or weight on file to calculate BMI  Physical Exam  GEN:  alert and oriented x 3, no apparent distress, appears well  CARDIAC: rrr, no m/r/g  PULMONARY: cta b/l, no wheeze  ABDOMEN: gravid, soft, LLQ tenderness  GENITOURINARY: cervix at 2cm dilation   EXTREMITIES: nontender,  edema  FETAL ASSESSMENT:  FHT: Baseline Rate: 120 bpm  Variability: Moderate 6-25 bpm  Accelerations: 15 x 15 or greater  Decelerations: None  FHR Category: Category I  TOCO: Contraction Frequency (minutes): irregular  Contraction Duration (seconds): 60-80  Contraction Quality: Mild    Labs: No results found for this or any previous visit (from the past 24 hour(s))  Lab, Imaging and other studies: I have personally reviewed pertinent reports      Sandy Hope  6/14/2023, 12:47 PM

## 2023-06-15 ENCOUNTER — ROUTINE PRENATAL (OUTPATIENT)
Dept: OBGYN CLINIC | Facility: CLINIC | Age: 18
End: 2023-06-15

## 2023-06-15 VITALS
BODY MASS INDEX: 32.6 KG/M2 | DIASTOLIC BLOOD PRESSURE: 51 MMHG | SYSTOLIC BLOOD PRESSURE: 101 MMHG | WEIGHT: 184 LBS | HEIGHT: 63 IN | HEART RATE: 72 BPM

## 2023-06-15 DIAGNOSIS — Z3A.36 36 WEEKS GESTATION OF PREGNANCY: Primary | ICD-10-CM

## 2023-06-15 DIAGNOSIS — O36.5930 POOR FETAL GROWTH AFFECTING MANAGEMENT OF MOTHER IN THIRD TRIMESTER, SINGLE OR UNSPECIFIED FETUS: ICD-10-CM

## 2023-06-15 PROCEDURE — 99213 OFFICE O/P EST LOW 20 MIN: CPT | Performed by: OBSTETRICS & GYNECOLOGY

## 2023-06-21 ENCOUNTER — ULTRASOUND (OUTPATIENT)
Dept: PERINATAL CARE | Facility: CLINIC | Age: 18
End: 2023-06-21
Payer: MEDICARE

## 2023-06-21 VITALS
HEART RATE: 87 BPM | SYSTOLIC BLOOD PRESSURE: 104 MMHG | BODY MASS INDEX: 31.68 KG/M2 | WEIGHT: 178.8 LBS | HEIGHT: 63 IN | DIASTOLIC BLOOD PRESSURE: 68 MMHG

## 2023-06-21 DIAGNOSIS — O36.5930 INTRAUTERINE GROWTH RESTRICTION AFFECTING ANTEPARTUM CARE OF MOTHER IN THIRD TRIMESTER, SINGLE OR UNSPECIFIED FETUS: ICD-10-CM

## 2023-06-21 DIAGNOSIS — Z3A.37 37 WEEKS GESTATION OF PREGNANCY: Primary | ICD-10-CM

## 2023-06-21 PROCEDURE — 76820 UMBILICAL ARTERY ECHO: CPT | Performed by: OBSTETRICS & GYNECOLOGY

## 2023-06-21 PROCEDURE — 76815 OB US LIMITED FETUS(S): CPT | Performed by: OBSTETRICS & GYNECOLOGY

## 2023-06-21 PROCEDURE — 59025 FETAL NON-STRESS TEST: CPT | Performed by: OBSTETRICS & GYNECOLOGY

## 2023-06-21 NOTE — LETTER
June 21, 2023     [id number] [facility] PROVIDER    Patient: Foster Lombard   YOB: 2005   Date of Visit: 6/21/2023       Dear   Provider: Thank you for referring Jed Pike to me for evaluation  Below are my notes for this consultation  If you have questions, please do not hesitate to call me  I look forward to following your patient along with you           Sincerely,        Ambrosio Alberto MD        CC: No Recipients    Ambrosio Alberto MD  6/20/2023  4:47 PM  Sign when Signing Visit  Please refer to the [facility] ultrasound report in Ob Procedures for additional information regarding today's visit

## 2023-06-21 NOTE — PATIENT INSTRUCTIONS
Kick Counts in Pregnancy   WHAT YOU NEED TO KNOW:   What do I need to know about kick counts? Kick counts measure how much your baby is moving in your womb  A kick from your baby can be felt as a twist, turn, swish, roll, or jab  It is common to feel your baby kicking at 26 to 28 weeks of pregnancy  You may feel your baby kick as early as 20 weeks of pregnancy  You may want to start counting at 28 weeks  Why should I measure kick counts? Your baby's movement may provide information about your baby's health  He or she may move less, or not at all, if there are problems  Your baby may move less if he or she is not getting enough oxygen or nutrition from the placenta  Do not smoke while you are pregnant  Smoking decreases the amount of oxygen that gets to your baby  Talk to your healthcare provider if you need help to quit smoking  Tell your healthcare provider as soon as you feel a change in your baby's movements  When do I measure kick counts? Measure kick counts at the same time every day  Measure kick counts when your baby is awake and most active  Your baby may be most active in the evening  How do I measure kick counts? Check that your baby is awake before you measure kick counts  You can wake up your baby by lightly pushing on your belly, walking, or drinking something cold  Your healthcare provider may tell you different ways to measure kick counts  You may be told to do the following:  Use a chart or clock to keep track of the time you start and finish counting  Sit in a chair or lie on your left side  Place your hands on the largest part of your belly  Count until you reach 10 kicks  Write down how much time it takes to count 10 kicks  It may take 30 minutes to 2 hours to count 10 kicks  It should not take more than 2 hours to count 10 kicks  When should I contact my doctor? You feel a change in the number of kicks or movements of your baby       You feel fewer than 10 kicks within 2 hours  You have questions or concerns about your baby's movements  CARE AGREEMENT:   You have the right to help plan your care  Learn about your health condition and how it may be treated  Discuss treatment options with your healthcare providers to decide what care you want to receive  You always have the right to refuse treatment  The above information is an  only  It is not intended as medical advice for individual conditions or treatments  Talk to your doctor, nurse or pharmacist before following any medical regimen to see if it is safe and effective for you  © Copyright Kamalj[employer] 2022 Information is for End User's use only and may not be sold, redistributed or otherwise used for commercial purposes

## 2023-06-22 ENCOUNTER — ROUTINE PRENATAL (OUTPATIENT)
Dept: OBGYN CLINIC | Facility: CLINIC | Age: 18
End: 2023-06-22

## 2023-06-22 VITALS
HEART RATE: 108 BPM | WEIGHT: 174.8 LBS | DIASTOLIC BLOOD PRESSURE: 69 MMHG | BODY MASS INDEX: 30.97 KG/M2 | HEIGHT: 63 IN | RESPIRATION RATE: 18 BRPM | SYSTOLIC BLOOD PRESSURE: 103 MMHG

## 2023-06-22 DIAGNOSIS — Z34.93 PRENATAL CARE IN THIRD TRIMESTER: ICD-10-CM

## 2023-06-22 DIAGNOSIS — Z3A.37 37 WEEKS GESTATION OF PREGNANCY: Primary | ICD-10-CM

## 2023-06-22 DIAGNOSIS — O36.5930 POOR FETAL GROWTH AFFECTING MANAGEMENT OF MOTHER IN THIRD TRIMESTER, SINGLE OR UNSPECIFIED FETUS: ICD-10-CM

## 2023-06-22 PROCEDURE — 99213 OFFICE O/P EST LOW 20 MIN: CPT | Performed by: NURSE PRACTITIONER

## 2023-06-22 NOTE — PROGRESS NOTES
Shaji Willoughby presents today for routine OB visit at 37w3d  She is a   Pregnancy is complicated by fetal growth restriction  She is following with [facility]  for  testing and dopplers  Per [facility] recommendation delivery is 38-39 weeks  Blood Pressure: 103/69  Wt=79 3 kg (174 lb 12 8 oz); Body mass index is 30 96 kg/m² ; TWG=6 713 kg (14 lb 12 8 oz)  Fetal Heart Rate: 140;    SVE today: declines   Abdomen: gravid, soft, non-tender  She reports no complaints  Denies uterine contractions  Denies vaginal bleeding or leaking of fluid  Reports adequate fetal movement of at least 10 movements in 2 hours once daily  Discussed recommended delivery timing and IOL  We reviewed and signed IOL consent  She is scheduled for IOL on 23 at 8pm at 38w4d  Reviewed labor precautions and fetal kick counts as well as pre-eclampsia warning signs  Reviewed perineal massage for decreasing risk of perineal lacerations during delivery  Advised to continue medications and return in 1 week  Current Outpatient Medications   Medication Instructions   • albuterol (PROVENTIL HFA,VENTOLIN HFA) 90 mcg/act inhaler TAKE 2 PUFFS BY MOUTH EVERY 6 HOURS AS NEEDED FOR WHEEZE OR FOR SHORTNESS OF BREATH   • budesonide (Pulmicort Flexhaler) 90 MCG/ACT inhaler 1 puff, Inhalation, 2 times daily, Rinse mouth after use  • famotidine (PEPCID) 20 mg, Oral, 2 times daily   • ondansetron (ZOFRAN) 4 mg, Oral, Every 8 hours PRN   • prenatal vitamin (CLASSIC FORMULA) 27-0 8 mg 1 tablet, Oral, Daily          Pregnancy Problems (from 22 to present)     Problem Noted Resolved    Poor fetal growth affecting management of mother in third trimester 2023 by Nirmala Maza MD No    Overview Addendum 2023  9:59 AM by Joshua Fink MD     Ultrasounds  23 at 29w2d: Vertex  Fundal placenta  Possible asymmetric FGR  EFW 10th percentile with the fetal abdomen in the 5th percentile   Weekly NST/fluid scans and umbilical Dopplers  23 at 33w3d: persistent mild FGR, EFW 5%, AC and BPD <3%, normal fluid and dopplers    23: 35w6d, EFW 4% on  with normal dopplers     Delivery is recommended at 38 - 39 weeks in the setting of reassuring   surveillance and Dopplers         Marijuana use during pregnancy 2023 by Jazmyne Angulo MD No    Overview Signed 2023  9:01 PM by Jazmyne Angulo MD     Using for depression and her hyperemesis-if she she cannot stop recommend she obtain a medical marijuana card and avoid buying illicit drug         Proteinuria affecting pregnancy, antepartum, second trimester 2023 by Jazmyne Angulo MD No    Overview Addendum 3/24/2023 10:58 AM by Jazmyne Angulo MD     PC ratio normal on 23  Looks like she develops proteinuria in response to dehydration and positive ketones and this resolves when she is no longer dehydrated  Gastroesophageal reflux during pregnancy in second trimester, antepartum 2023 by Jazmyne Angulo MD No    Overview Addendum 3/23/2023 11:13 AM by Pily Frank MD     Improved with pepcid         Asthma during pregnancy 2023 by Jazmyne Angulo MD No    Overview Addendum 2023 12:32 PM by Pily Frank MD     Improved since starting inhaled steroid -> rarely uses her albuterol         37 weeks gestation of pregnancy 2022 by Pily Frank MD No    Overview Addendum 2023  9:57 AM by Pily Frank MD     Dated via 1st trimester US on 22 -> JESSICA 2023  - Doing well today  - Continue prenatal vitamin  - Prenatal labs: wnl  - Initial OB U/S: wnl  - MSAFP and NIPT- low risk  - Level 2 U/S: wnl  - 28 week labs wnl  - Tdap given   - Delivery consent signed   - Contraception plan: Depo Provera  - 23 patient reports itching on hands, forearms and occasionally feet  Bile acids and CMP wnl    -GBS negative

## 2023-06-27 ENCOUNTER — ULTRASOUND (OUTPATIENT)
Dept: PERINATAL CARE | Facility: CLINIC | Age: 18
End: 2023-06-27
Payer: MEDICARE

## 2023-06-27 VITALS
DIASTOLIC BLOOD PRESSURE: 62 MMHG | HEIGHT: 63 IN | SYSTOLIC BLOOD PRESSURE: 100 MMHG | WEIGHT: 175 LBS | BODY MASS INDEX: 31.01 KG/M2 | HEART RATE: 96 BPM

## 2023-06-27 DIAGNOSIS — O36.5930 POOR FETAL GROWTH AFFECTING MANAGEMENT OF MOTHER IN THIRD TRIMESTER, SINGLE OR UNSPECIFIED FETUS: Primary | ICD-10-CM

## 2023-06-27 DIAGNOSIS — Z3A.38 38 WEEKS GESTATION OF PREGNANCY: ICD-10-CM

## 2023-06-27 DIAGNOSIS — Z3A.37 37 WEEKS GESTATION OF PREGNANCY: ICD-10-CM

## 2023-06-27 PROCEDURE — 76815 OB US LIMITED FETUS(S): CPT | Performed by: NURSE PRACTITIONER

## 2023-06-27 PROCEDURE — 59025 FETAL NON-STRESS TEST: CPT | Performed by: NURSE PRACTITIONER

## 2023-06-27 PROCEDURE — 76820 UMBILICAL ARTERY ECHO: CPT | Performed by: NURSE PRACTITIONER

## 2023-06-27 NOTE — PROGRESS NOTES
"[id number] [facility] [address] [address] [facility] [address]: Ms Quentin Luu was seen today at 07N3H for umbilical artery Doppler study, BOBBI, and NST  See ultrasound report under \"OB Procedures\" tab  She reports good fetal movement and has no concerns today  She is scheduled for an induction of labor on 6/30/2023 at 37 weeks 5 days  Please don't hesitate to contact our office with any concerns or questions   -NELSON Tapia      I spent 10 minutes devoted to patient care (3 min chart preparation, 4 minutes face to face and 3 minutes documenting)        "

## 2023-06-29 ENCOUNTER — PATIENT OUTREACH (OUTPATIENT)
Dept: OBGYN CLINIC | Facility: CLINIC | Age: 18
End: 2023-06-29

## 2023-06-29 NOTE — PATIENT INSTRUCTIONS
Thank you for choosing us for your  care today  If you have any questions about your ultrasound or care, please do not hesitate to contact us or your primary obstetrician  Some general instructions for your pregnancy are:    Exercise: Aim for 22 minutes per day (150 minutes per week) of regular exercise  Walking is great! Nutrition: Choose healthy sources of calcium, iron, and protein  Learn about Preeclampsia: preeclampsia is a common, serious high blood pressure complication in pregnancy  A blood pressure of 068QQKO (systolic or top number) or 93TYMB (diastolic or bottom number) is not normal and needs evaluation by your doctor  Aspirin is sometimes prescribed in early pregnancy to prevent preeclampsia in women with risk factors - ask your obstetrician if you should be on this medication  For more resources, visit:  [employer] fi  If you smoke, try to reduce how many cigarettes you smoke or try to quit completely  Do not vape  Other warning signs to watch out for in pregnancy or postpartum: chest pain, obstructed breathing or shortness of breath, seizures, thoughts of hurting yourself or your baby, bleeding, a painful or swollen leg, fever, or headache (see [facility] POST-BIRTH Warning Signs campaign)  If these happen call 911  Itching is also not normal in pregnancy and if you experience this, especially over your hands and feet, potentially worse at night, notify your doctors

## 2023-06-29 NOTE — PROGRESS NOTES
EDVIN LEWIS spoke with Pt for follow up  Pt reported she is doing well  Pt states she has needed items for the unborn baby  Pt was educated about PPD signs and Ack  of Paternity form  Pt verbalized understanding  Pt denies other concenrs at this time

## 2023-06-30 ENCOUNTER — HOSPITAL ENCOUNTER (INPATIENT)
Facility: HOSPITAL | Age: 18
LOS: 3 days | Discharge: HOME | End: 2023-07-03
Attending: OBSTETRICS & GYNECOLOGY | Admitting: OBSTETRICS & GYNECOLOGY
Payer: MEDICARE

## 2023-06-30 ENCOUNTER — APPOINTMENT (OUTPATIENT)
Dept: LABOR AND DELIVERY | Facility: HOSPITAL | Age: 18
End: 2023-06-30
Payer: MEDICARE

## 2023-06-30 DIAGNOSIS — F12.90 MARIJUANA USE DURING PREGNANCY: ICD-10-CM

## 2023-06-30 DIAGNOSIS — O99.320 MARIJUANA USE DURING PREGNANCY: ICD-10-CM

## 2023-06-30 PROCEDURE — NC001 PR NO CHARGE: Performed by: OBSTETRICS & GYNECOLOGY

## 2023-06-30 PROCEDURE — 4A1HXCZ MONITORING OF PRODUCTS OF CONCEPTION, CARDIAC RATE, EXTERNAL APPROACH: ICD-10-PCS | Performed by: STUDENT IN AN ORGANIZED HEALTH CARE EDUCATION/TRAINING PROGRAM

## 2023-06-30 RX ORDER — BUPIVACAINE HYDROCHLORIDE 2.5 MG/ML
30 INJECTION, SOLUTION EPIDURAL; INFILTRATION; INTRACAUDAL ONCE AS NEEDED
Status: DISCONTINUED | OUTPATIENT
Start: 2023-06-30 | End: 2023-07-03 | Stop reason: HOSPADM

## 2023-06-30 RX ORDER — ALBUTEROL SULFATE 90 UG/1
2 AEROSOL, METERED RESPIRATORY (INHALATION) EVERY 6 HOURS PRN
Status: DISCONTINUED | OUTPATIENT
Start: 2023-06-30 | End: 2023-07-03 | Stop reason: HOSPADM

## 2023-06-30 RX ORDER — ACETAMINOPHEN 325 MG/1
650 TABLET ORAL EVERY 6 HOURS PRN
Status: DISCONTINUED | OUTPATIENT
Start: 2023-06-30 | End: 2023-07-02

## 2023-06-30 RX ORDER — SODIUM CHLORIDE, SODIUM LACTATE, POTASSIUM CHLORIDE, CALCIUM CHLORIDE 600; 310; 30; 20 MG/100ML; MG/100ML; MG/100ML; MG/100ML
125 INJECTION, SOLUTION INTRAVENOUS CONTINUOUS
Status: DISCONTINUED | OUTPATIENT
Start: 2023-06-30 | End: 2023-07-03 | Stop reason: HOSPADM

## 2023-06-30 RX ORDER — ONDANSETRON 2 MG/ML
4 INJECTION INTRAMUSCULAR; INTRAVENOUS EVERY 6 HOURS PRN
Status: DISCONTINUED | OUTPATIENT
Start: 2023-06-30 | End: 2023-07-03 | Stop reason: HOSPADM

## 2023-07-01 ENCOUNTER — ANESTHESIA EVENT (INPATIENT)
Dept: ANESTHESIOLOGY | Facility: HOSPITAL | Age: 18
End: 2023-07-01
Payer: MEDICARE

## 2023-07-01 ENCOUNTER — ANESTHESIA (INPATIENT)
Dept: ANESTHESIOLOGY | Facility: HOSPITAL | Age: 18
End: 2023-07-01
Payer: MEDICARE

## 2023-07-01 PROBLEM — Z3A.38 38 WEEKS GESTATION OF PREGNANCY: Status: ACTIVE | Noted: 2022-12-30

## 2023-07-01 LAB
ABO GROUP BLD: NORMAL
AMPHETAMINES SERPL QL SCN: NEGATIVE
BARBITURATES UR QL: NEGATIVE
BENZODIAZ UR QL: NEGATIVE
BLD GP AB SCN SERPL QL: NEGATIVE
COCAINE UR QL: NEGATIVE
ERYTHROCYTE [DISTWIDTH] IN BLOOD BY AUTOMATED COUNT: 13.9 % (ref 11.6–15.1)
HCT VFR BLD AUTO: 36.5 % (ref 34.8–46.1)
HGB BLD-MCNC: 12.1 G/DL (ref 11.5–15.4)
HOLD SPECIMEN: NORMAL
MCH RBC QN AUTO: 30 PG (ref 26.8–34.3)
MCHC RBC AUTO-ENTMCNC: 33.2 G/DL (ref 31.4–37.4)
MCV RBC AUTO: 91 FL (ref 82–98)
METHADONE UR QL: NEGATIVE
OPIATES UR QL SCN: NEGATIVE
OXYCODONE+OXYMORPHONE UR QL SCN: NEGATIVE
PCP UR QL: NEGATIVE
PLATELET # BLD AUTO: 306 THOUSANDS/UL (ref 149–390)
PMV BLD AUTO: 10.1 FL (ref 8.9–12.7)
RBC # BLD AUTO: 4.03 MILLION/UL (ref 3.81–5.12)
RH BLD: POSITIVE
SPECIMEN EXPIRATION DATE: NORMAL
THC UR QL: POSITIVE
TREPONEMA PALLIDUM IGG+IGM AB [PRESENCE] IN SERUM OR PLASMA BY IMMUNOASSAY: NORMAL
WBC # BLD AUTO: 14.86 THOUSAND/UL (ref 4.31–10.16)

## 2023-07-01 PROCEDURE — 86850 RBC ANTIBODY SCREEN: CPT | Performed by: OBSTETRICS & GYNECOLOGY

## 2023-07-01 PROCEDURE — 86900 BLOOD TYPING SEROLOGIC ABO: CPT | Performed by: OBSTETRICS & GYNECOLOGY

## 2023-07-01 PROCEDURE — 3E033VJ INTRODUCTION OF OTHER HORMONE INTO PERIPHERAL VEIN, PERCUTANEOUS APPROACH: ICD-10-PCS | Performed by: STUDENT IN AN ORGANIZED HEALTH CARE EDUCATION/TRAINING PROGRAM

## 2023-07-01 PROCEDURE — 86780 TREPONEMA PALLIDUM: CPT | Performed by: OBSTETRICS & GYNECOLOGY

## 2023-07-01 PROCEDURE — 86901 BLOOD TYPING SEROLOGIC RH(D): CPT | Performed by: OBSTETRICS & GYNECOLOGY

## 2023-07-01 PROCEDURE — 85027 COMPLETE CBC AUTOMATED: CPT | Performed by: OBSTETRICS & GYNECOLOGY

## 2023-07-01 PROCEDURE — 80307 DRUG TEST PRSMV CHEM ANLYZR: CPT

## 2023-07-01 RX ORDER — OXYTOCIN/RINGER'S LACTATE 30/500 ML
1-30 PLASTIC BAG, INJECTION (ML) INTRAVENOUS
Status: DISCONTINUED | OUTPATIENT
Start: 2023-07-01 | End: 2023-07-01

## 2023-07-01 RX ORDER — ROPIVACAINE HYDROCHLORIDE 2 MG/ML
INJECTION, SOLUTION EPIDURAL; INFILTRATION; PERINEURAL CONTINUOUS PRN
Status: DISCONTINUED | OUTPATIENT
Start: 2023-07-01 | End: 2023-07-02 | Stop reason: HOSPADM

## 2023-07-01 RX ORDER — OXYTOCIN/RINGER'S LACTATE 30/500 ML
1-30 PLASTIC BAG, INJECTION (ML) INTRAVENOUS
Status: DISCONTINUED | OUTPATIENT
Start: 2023-07-01 | End: 2023-07-03 | Stop reason: HOSPADM

## 2023-07-01 RX ORDER — LIDOCAINE HYDROCHLORIDE AND EPINEPHRINE 15; 5 MG/ML; UG/ML
INJECTION, SOLUTION EPIDURAL AS NEEDED
Status: DISCONTINUED | OUTPATIENT
Start: 2023-07-01 | End: 2023-07-02 | Stop reason: HOSPADM

## 2023-07-01 RX ORDER — LIDOCAINE HYDROCHLORIDE 10 MG/ML
INJECTION, SOLUTION EPIDURAL; INFILTRATION; INTRACAUDAL; PERINEURAL AS NEEDED
Status: DISCONTINUED | OUTPATIENT
Start: 2023-07-01 | End: 2023-07-02 | Stop reason: HOSPADM

## 2023-07-01 RX ADMIN — SODIUM CHLORIDE, SODIUM LACTATE, POTASSIUM CHLORIDE, AND CALCIUM CHLORIDE 125 ML/HR: .6; .31; .03; .02 INJECTION, SOLUTION INTRAVENOUS at 22:23

## 2023-07-01 RX ADMIN — SODIUM CHLORIDE, SODIUM LACTATE, POTASSIUM CHLORIDE, AND CALCIUM CHLORIDE 125 ML/HR: .6; .31; .03; .02 INJECTION, SOLUTION INTRAVENOUS at 11:40

## 2023-07-01 RX ADMIN — Medication 2 MILLI-UNITS/MIN: at 11:47

## 2023-07-01 RX ADMIN — ROPIVACAINE HYDROCHLORIDE 10 ML/HR: 2 INJECTION, SOLUTION EPIDURAL; INFILTRATION at 21:57

## 2023-07-01 RX ADMIN — LIDOCAINE HYDROCHLORIDE 3 ML: 10 INJECTION, SOLUTION EPIDURAL; INFILTRATION; INTRACAUDAL; PERINEURAL at 21:50

## 2023-07-01 RX ADMIN — ONDANSETRON 4 MG: 2 INJECTION INTRAMUSCULAR; INTRAVENOUS at 22:43

## 2023-07-01 RX ADMIN — SODIUM CHLORIDE, SODIUM LACTATE, POTASSIUM CHLORIDE, AND CALCIUM CHLORIDE 125 ML/HR: .6; .31; .03; .02 INJECTION, SOLUTION INTRAVENOUS at 15:43

## 2023-07-01 RX ADMIN — ROPIVACAINE HYDROCHLORIDE: 2 INJECTION, SOLUTION EPIDURAL; INFILTRATION at 22:48

## 2023-07-01 RX ADMIN — SODIUM CHLORIDE, SODIUM LACTATE, POTASSIUM CHLORIDE, AND CALCIUM CHLORIDE 125 ML/HR: .6; .31; .03; .02 INJECTION, SOLUTION INTRAVENOUS at 19:32

## 2023-07-01 RX ADMIN — LIDOCAINE HYDROCHLORIDE AND EPINEPHRINE 5 ML: 15; 5 INJECTION, SOLUTION EPIDURAL at 21:54

## 2023-07-01 RX ADMIN — ONDANSETRON 4 MG: 2 INJECTION INTRAMUSCULAR; INTRAVENOUS at 10:29

## 2023-07-01 NOTE — H&P
H & P- Obstetrics   Dolores Parikh 19 y.o. female MRN: [id number]  Unit/Bed#: Kami Sifuentes Encounter: [id number]    Assessment: 19 y.o.  at 38w4d admitted for induction of labor for FGR. SVE: /-2  FHT: Reactive  Clinical EFW: 4%, AC 4% ; Cephalic confirmed by TAUS  GBS status: negative   Postpartum contraception plan: Depo-Provera    Plan:   * 38 weeks gestation of pregnancy  Assessment & Plan  Admit   T&S, CBC, RPR  CLD  IV fluids  GBS prophylaxis is not needed   Induction with Pitocin titration  Contraception: Depo Provera      Poor fetal growth affecting management of mother in third trimester  Assessment & Plan  AC              29.4 cm        33 weeks 3 days* (4%)  HC              29.7 cm        32 weeks 6 days* (<5%)    EFW Hadlock 4   2155 grams - 4 lbs 12 oz                 (4%)    Normal dopplers    Asthma during pregnancy  Assessment & Plan  Albuterol prn    Marijuana use during pregnancy  Assessment & Plan  F/u UDS on admission        Discussed case and plan w/ Dr. Anne-Marie Delgado      Chief Complaint: none    HPI: Dolores Parikh is a 19 y.o.  with an JESSICA of 7/10/2023, by Ultrasound at 38w4d who is being admitted for induction of labor for FGR. She denies having uterine contractions, has no LOF, and reports no VB. She states she has felt good FM. Patient Active Problem List   Diagnosis   • Acne   • 37 weeks gestation of pregnancy   • Perineal abscess   • Marijuana use during pregnancy   • Proteinuria affecting pregnancy, antepartum, second trimester   • Gastroesophageal reflux during pregnancy in second trimester, antepartum   • Asthma during pregnancy   • Poor fetal growth affecting management of mother in third trimester   • Abdominal pain in pregnancy, third trimester       Baby complications/comments: FGR     Review of Systems   Constitutional: Negative for chills and fever. HENT: Negative for ear pain and sore throat. Eyes: Negative for pain and visual disturbance.    Respiratory: Negative for cough and shortness of breath. Cardiovascular: Negative for chest pain and palpitations. Gastrointestinal: Negative for abdominal pain, constipation, diarrhea, nausea and vomiting. Genitourinary: Negative for dysuria, hematuria and vaginal bleeding. Musculoskeletal: Negative for arthralgias and back pain. Skin: Negative for color change and rash. Neurological: Negative for syncope, light-headedness and headaches. All other systems reviewed and are negative. OB Hx:  OB History    Para Term  AB Living   1             SAB IAB Ectopic Multiple Live Births                  # Outcome Date GA Lbr Adama/2nd Weight Sex Delivery Anes PTL Lv   1 Current                Past Medical Hx:  Past Medical History:   Diagnosis Date   • Asthma    • No known problems        Past Surgical hx:  Past Surgical History:   Procedure Laterality Date   • NO PAST SURGERIES         Social Hx:  Social History     Tobacco Use   • Smoking status: Never     Passive exposure: Yes   • Smokeless tobacco: Never   • Tobacco comments:     Exposure to secondhand smoke    Vaping Use   • Vaping Use: Never used   Substance Use Topics   • Alcohol use: Never   • Drug use: Not Currently     Types: Marijuana     Comment: current everyday user         No Known Allergies    Medications Prior to Admission   Medication   • albuterol (PROVENTIL HFA,VENTOLIN HFA) 90 mcg/act inhaler   • budesonide (Pulmicort Flexhaler) 90 MCG/ACT inhaler   • famotidine (PEPCID) 20 mg tablet   • ondansetron (ZOFRAN) 4 mg tablet   • prenatal vitamin (CLASSIC FORMULA) 27-0.8 mg       Objective: There is no height or weight on file to calculate BMI. Physical Exam:  Physical Exam  Constitutional:       General: She is not in acute distress. Appearance: Normal appearance. She is not ill-appearing. HENT:      Head: Normocephalic and atraumatic. Mouth/Throat:      Mouth: Mucous membranes are moist.      Pharynx: Oropharynx is clear. Cardiovascular:      Rate and Rhythm: Normal rate and regular rhythm. Pulses: Normal pulses. Heart sounds: Normal heart sounds. Pulmonary:      Effort: Pulmonary effort is normal.      Breath sounds: Normal breath sounds. Abdominal:      Palpations: Abdomen is soft. Neurological:      General: No focal deficit present. Mental Status: She is alert and oriented to person, place, and time. Mental status is at baseline. Skin:     General: Skin is warm and dry. Capillary Refill: Capillary refill takes less than 2 seconds. Vitals reviewed.           FHT:  Baseline Rate: 120 bpm  FHR Category: Category I    TOCO:   Contraction Frequency (minutes): irregular  Contraction Duration (seconds): 60-80  Contraction Quality: Mild    Lab Results   Component Value Date    WBC 10.42 (H) 05/25/2023    HGB 11.6 05/25/2023    HCT 34.7 (L) 05/25/2023     05/25/2023     Lab Results   Component Value Date    K 3.6 05/25/2023     05/25/2023    CO2 23 05/25/2023    BUN 6 05/25/2023    CREATININE 0.60 05/25/2023    AST 18 05/25/2023    ALT 18 05/25/2023     Prenatal Labs: Reviewed      Blood type: O pos  Antibody: neg  GBS: neg  HIV: non-reactive  Rubella: immune  Syphilis IgM/IgG: non-reactive  HBsAg: non-reactive  HCAb: non-reactive  Chlamydia: negative  Gonorrhea: negative  Diabetes 1 hour screen: 102  3 hour glucose: N/A  Platelets: 714  Hgb: 12.1  >2 Midnights  INPATIENT     Signature/Title: Serg Lopez MD  Date: 6/30/2023  Time: 11:00 PM

## 2023-07-01 NOTE — PLAN OF CARE
Problem: Knowledge Deficit  Goal: Patient/family/caregiver demonstrates understanding of disease process, treatment plan, medications, and discharge instructions  Description: Complete learning assessment and assess knowledge base. Interventions:  - Provide teaching at level of understanding  - Provide teaching via preferred learning methods  Outcome: Progressing     Problem: BIRTH - VAGINAL/ SECTION  Goal: Fetal and maternal status remain reassuring during the birth process  Description: INTERVENTIONS:  - Monitor vital signs  - Monitor fetal heart rate  - Monitor uterine activity  - Monitor labor progression (vaginal delivery)  - DVT prophylaxis  - Antibiotic prophylaxis  Outcome: Progressing  Goal: Emotionally satisfying birthing experience for mother/fetus  Description: Interventions:  - Assess, plan, implement and evaluate the nursing care given to the patient in labor  - Advocate the philosophy that each childbirth experience is a unique experience and support the family's chosen level of involvement and control during the labor process   - Actively participate in both the patient's and family's teaching of the birth process  - Consider cultural, Druze and age-specific factors and plan care for the patient in labor  Outcome: Progressing     Problem: Nutrition/Hydration-ADULT  Goal: Nutrient/Hydration intake appropriate for improving, restoring or maintaining nutritional needs  Description: Monitor and assess patient's nutrition/hydration status for malnutrition. Collaborate with interdisciplinary team and initiate plan and interventions as ordered. Monitor patient's weight and dietary intake as ordered or per policy. Utilize nutrition screening tool and intervene as necessary. Determine patient's food preferences and provide high-protein, high-caloric foods as appropriate.      INTERVENTIONS:  - Monitor oral intake, urinary output, labs, and treatment plans  - Assess nutrition and hydration status and recommend course of action  - Evaluate amount of meals eaten  - Assist patient with eating if necessary   - Allow adequate time for meals  - Recommend/ encourage appropriate diets, oral nutritional supplements, and vitamin/mineral supplements  - Order, calculate, and assess calorie counts as needed  - Recommend, monitor, and adjust tube feedings and TPN/PPN based on assessed needs  - Assess need for intravenous fluids  - Provide specific nutrition/hydration education as appropriate  - Include patient/family/caregiver in decisions related to nutrition  Outcome: Progressing     Problem: PAIN - ADULT  Goal: Verbalizes/displays adequate comfort level or baseline comfort level  Description: Interventions:  - Encourage patient to monitor pain and request assistance  - Assess pain using appropriate pain scale  - Administer analgesics based on type and severity of pain and evaluate response  - Implement non-pharmacological measures as appropriate and evaluate response  - Consider cultural and social influences on pain and pain management  - Notify physician/advanced practitioner if interventions unsuccessful or patient reports new pain  Outcome: Progressing     Problem: INFECTION - ADULT  Goal: Absence or prevention of progression during hospitalization  Description: INTERVENTIONS:  - Assess and monitor for signs and symptoms of infection  - Monitor lab/diagnostic results  - Monitor all insertion sites, i.e. indwelling lines, tubes, and drains  - Monitor endotracheal if appropriate and nasal secretions for changes in amount and color  - Letha appropriate cooling/warming therapies per order  - Administer medications as ordered  - Instruct and encourage patient and family to use good hand hygiene technique  - Identify and instruct in appropriate isolation precautions for identified infection/condition  Outcome: Progressing  Goal: Absence of fever/infection during neutropenic period  Description: INTERVENTIONS:  - Monitor WBC    Outcome: Progressing     Problem: SAFETY ADULT  Goal: Patient will remain free of falls  Description: INTERVENTIONS:  - Educate patient/family on patient safety including physical limitations  - Instruct patient to call for assistance with activity   - Consult OT/PT to assist with strengthening/mobility   - Keep Call bell within reach  - Keep bed low and locked with side rails adjusted as appropriate  - Keep care items and personal belongings within reach  - Initiate and maintain comfort rounds  - Make Fall Risk Sign visible to staff  - Apply yellow socks and bracelet for high fall risk patients  - Consider moving patient to room near nurses station  Outcome: Progressing  Goal: Maintain or return to baseline ADL function  Description: INTERVENTIONS:  -  Assess patient's ability to carry out ADLs; assess patient's baseline for ADL function and identify physical deficits which impact ability to perform ADLs (bathing, care of mouth/teeth, toileting, grooming, dressing, etc.)  - Assess/evaluate cause of self-care deficits   - Assess range of motion  - Assess patient's mobility; develop plan if impaired  - Assess patient's need for assistive devices and provide as appropriate  - Encourage maximum independence but intervene and supervise when necessary  - Involve family in performance of ADLs  - Assess for home care needs following discharge   - Consider OT consult to assist with ADL evaluation and planning for discharge  - Provide patient education as appropriate  Outcome: Progressing  Goal: Maintains/Returns to pre admission functional level  Description: INTERVENTIONS:  - Perform BMAT or MOVE assessment daily.   - Set and communicate daily mobility goal to care team and patient/family/caregiver.    - Collaborate with rehabilitation services on mobility goals if consulted  - Out of bed for toileting  - Record patient progress and toleration of activity level   Outcome: Progressing Problem: DISCHARGE PLANNING  Goal: Discharge to home or other facility with appropriate resources  Description: INTERVENTIONS:  - Identify barriers to discharge w/patient and caregiver  - Arrange for needed discharge resources and transportation as appropriate  - Identify discharge learning needs (meds, wound care, etc.)  - Arrange for interpretive services to assist at discharge as needed  - Refer to Case Management Department for coordinating discharge planning if the patient needs post-hospital services based on physician/advanced practitioner order or complex needs related to functional status, cognitive ability, or social support system  Outcome: Progressing

## 2023-07-01 NOTE — OB LABOR/OXYTOCIN SAFETY PROGRESS
Oxytocin Safety Progress Check Note - Timothy Sarbjit 19 y.o. female MRN: [id number]    Unit/Bed#: -01 Encounter: [id number]    Dose (alison-units/min) Oxytocin: 8 alison-units/min  Contraction Frequency (minutes): irregular  Contraction Quality: Mild  Tachysystole: No   Cervical Dilation: 4        Cervical Effacement: 70  Fetal Station: -2  Baseline Rate: 120 bpm  Fetal Heart Rate: 122 BPM  FHR Category: Category I               Vital Signs:   Vitals:    07/01/23 1321   BP: 90/54   Pulse: 79   Resp:    Temp:    SpO2:        Notes/comments:   Check deferred at this time. Continue pitocin titration.             Marlen Anderson MD 7/1/2023 1:48 PM

## 2023-07-01 NOTE — OB LABOR/OXYTOCIN SAFETY PROGRESS
Oxytocin Safety Progress Check Note - Celso Escalon 19 y.o. female MRN: [id number]    Unit/Bed#: -01 Encounter: [id number]    Dose (alison-units/min) Oxytocin: 18 alison-units/min  Contraction Frequency (minutes): 1-2.5  Contraction Quality: Mild, Moderate  Tachysystole: No   Cervical Dilation: 5        Cervical Effacement: 80  Fetal Station: -1  Baseline Rate: 120 bpm  Fetal Heart Rate: 124 BPM  FHR Category: Category I               Vital Signs:   Vitals:    07/01/23 1850   BP: 108/65   Pulse: 81   Resp:    Temp:    SpO2:        Notes/comments:   Patient feeling pressure. SVE as above. Tracing category 1. Continue pitocin titration.      Zeny Underwood MD 7/1/2023 7:43 PM

## 2023-07-01 NOTE — OB LABOR/OXYTOCIN SAFETY PROGRESS
Oxytocin Safety Progress Check Note - Brittany Montague 19 y.o. female MRN: [id number]    Unit/Bed#: -01 Encounter: [id number]    Dose (alison-units/min) Oxytocin: 16 alison-units/min  Contraction Frequency (minutes): 1-3  Contraction Quality: Mild  Tachysystole: No   Cervical Dilation: 5        Cervical Effacement: 80  Fetal Station: -2  Baseline Rate: 120 bpm  Fetal Heart Rate: 121 BPM  FHR Category: Category I               Vital Signs:   Vitals:    07/01/23 1720   BP: 105/65   Pulse: 74   Resp:    Temp:    SpO2:        Notes/comments:   SVE as above. Patient is currently comfortable without epidural.    Continue pitocin titration. Plan for AROM at next check.    Discussed with Dr. Tamanna Willis MD 7/1/2023 5:57 PM

## 2023-07-01 NOTE — ASSESSMENT & PLAN NOTE
Continue routine post partum care  Encourage ambulation and  Breastfeeding/pumping   DMPA (administered for contraception on 7/2/23)

## 2023-07-02 LAB
BASE EXCESS BLDCOA CALC-SCNC: -1.1 MMOL/L (ref 3–11)
BASE EXCESS BLDCOV CALC-SCNC: -1.2 MMOL/L (ref 1–9)
HCO3 BLDCOA-SCNC: 27.6 MMOL/L (ref 17.3–27.3)
HCO3 BLDCOV-SCNC: 24 MMOL/L (ref 12.2–28.6)
O2 CT VFR BLDCOA CALC: 5.9 ML/DL
OXYHGB MFR BLDCOA: 26.8 %
OXYHGB MFR BLDCOV: 65.2 %
PCO2 BLDCOA: 63 MM[HG] (ref 30–60)
PCO2 BLDCOV: 41.9 MM HG (ref 27–43)
PH BLDCOA: 7.26 [PH] (ref 7.23–7.43)
PH BLDCOV: 7.38 [PH] (ref 7.19–7.49)
PO2 BLDCOA: 15.4 MM HG (ref 5–25)
PO2 BLDCOV: 26.3 MM HG (ref 15–45)
SAO2 % BLDCOV: 13.7 ML/DL

## 2023-07-02 PROCEDURE — 82805 BLOOD GASES W/O2 SATURATION: CPT | Performed by: OBSTETRICS & GYNECOLOGY

## 2023-07-02 PROCEDURE — 88307 TISSUE EXAM BY PATHOLOGIST: CPT | Performed by: PATHOLOGY

## 2023-07-02 PROCEDURE — 59409 OBSTETRICAL CARE: CPT | Performed by: STUDENT IN AN ORGANIZED HEALTH CARE EDUCATION/TRAINING PROGRAM

## 2023-07-02 PROCEDURE — NC001 PR NO CHARGE: Performed by: STUDENT IN AN ORGANIZED HEALTH CARE EDUCATION/TRAINING PROGRAM

## 2023-07-02 PROCEDURE — 10907ZC DRAINAGE OF AMNIOTIC FLUID, THERAPEUTIC FROM PRODUCTS OF CONCEPTION, VIA NATURAL OR ARTIFICIAL OPENING: ICD-10-PCS | Performed by: STUDENT IN AN ORGANIZED HEALTH CARE EDUCATION/TRAINING PROGRAM

## 2023-07-02 RX ORDER — DIPHENHYDRAMINE HCL 25 MG
25 TABLET ORAL EVERY 6 HOURS PRN
Status: CANCELLED | OUTPATIENT
Start: 2023-07-02

## 2023-07-02 RX ORDER — MEDROXYPROGESTERONE ACETATE 150 MG/ML
150 INJECTION, SUSPENSION INTRAMUSCULAR ONCE
Status: COMPLETED | OUTPATIENT
Start: 2023-07-02 | End: 2023-07-02

## 2023-07-02 RX ORDER — DIAPER,BRIEF,INFANT-TODD,DISP
1 EACH MISCELLANEOUS DAILY PRN
Status: DISCONTINUED | OUTPATIENT
Start: 2023-07-02 | End: 2023-07-03 | Stop reason: HOSPADM

## 2023-07-02 RX ORDER — SIMETHICONE 80 MG
80 TABLET,CHEWABLE ORAL 4 TIMES DAILY PRN
Status: DISCONTINUED | OUTPATIENT
Start: 2023-07-02 | End: 2023-07-03 | Stop reason: HOSPADM

## 2023-07-02 RX ORDER — ACETAMINOPHEN 325 MG/1
650 TABLET ORAL EVERY 4 HOURS PRN
Status: DISCONTINUED | OUTPATIENT
Start: 2023-07-02 | End: 2023-07-03 | Stop reason: HOSPADM

## 2023-07-02 RX ORDER — DOCUSATE SODIUM 100 MG/1
100 CAPSULE, LIQUID FILLED ORAL 2 TIMES DAILY
Status: DISCONTINUED | OUTPATIENT
Start: 2023-07-02 | End: 2023-07-03 | Stop reason: HOSPADM

## 2023-07-02 RX ORDER — OXYTOCIN/RINGER'S LACTATE 30/500 ML
250 PLASTIC BAG, INJECTION (ML) INTRAVENOUS CONTINUOUS
Status: CANCELLED | OUTPATIENT
Start: 2023-07-02 | End: 2023-07-02

## 2023-07-02 RX ORDER — IBUPROFEN 600 MG/1
600 TABLET ORAL EVERY 6 HOURS
Status: DISCONTINUED | OUTPATIENT
Start: 2023-07-02 | End: 2023-07-03 | Stop reason: HOSPADM

## 2023-07-02 RX ORDER — HYDROXYZINE HYDROCHLORIDE 10 MG/1
10 TABLET, FILM COATED ORAL EVERY 6 HOURS PRN
Status: DISCONTINUED | OUTPATIENT
Start: 2023-07-02 | End: 2023-07-03 | Stop reason: HOSPADM

## 2023-07-02 RX ORDER — CALCIUM CARBONATE 500 MG/1
1000 TABLET, CHEWABLE ORAL DAILY PRN
Status: DISCONTINUED | OUTPATIENT
Start: 2023-07-02 | End: 2023-07-03 | Stop reason: HOSPADM

## 2023-07-02 RX ORDER — ONDANSETRON 2 MG/ML
4 INJECTION INTRAMUSCULAR; INTRAVENOUS EVERY 8 HOURS PRN
Status: CANCELLED | OUTPATIENT
Start: 2023-07-02

## 2023-07-02 RX ADMIN — ACETAMINOPHEN 650 MG: 325 TABLET, FILM COATED ORAL at 20:33

## 2023-07-02 RX ADMIN — HYDROCORTISONE 1 APPLICATION: 1 CREAM TOPICAL at 05:45

## 2023-07-02 RX ADMIN — ACETAMINOPHEN 650 MG: 325 TABLET, FILM COATED ORAL at 10:55

## 2023-07-02 RX ADMIN — IBUPROFEN 600 MG: 600 TABLET, FILM COATED ORAL at 17:39

## 2023-07-02 RX ADMIN — IBUPROFEN 600 MG: 600 TABLET, FILM COATED ORAL at 05:45

## 2023-07-02 RX ADMIN — MEDROXYPROGESTERONE ACETATE 150 MG: 150 INJECTION, SUSPENSION INTRAMUSCULAR at 10:43

## 2023-07-02 RX ADMIN — DOCUSATE SODIUM 100 MG: 100 CAPSULE, LIQUID FILLED ORAL at 10:27

## 2023-07-02 RX ADMIN — IBUPROFEN 600 MG: 600 TABLET, FILM COATED ORAL at 23:53

## 2023-07-02 RX ADMIN — Medication 1 APPLICATION: at 05:45

## 2023-07-02 RX ADMIN — DOCUSATE SODIUM 100 MG: 100 CAPSULE, LIQUID FILLED ORAL at 17:39

## 2023-07-02 NOTE — UTILIZATION REVIEW
Initial Clinical Review    Admission: Date/Time/Statement:   Admission Orders (From admission, onward)     Ordered        23 2257  Inpatient Admission  Once                      Orders Placed This Encounter   Procedures   • Inpatient Admission     Standing Status:   Standing     Number of Occurrences:   1     Order Specific Question:   Level of Care     Answer:   Med Surg [16]     Order Specific Question:   Estimated length of stay     Answer:   More than 2 Midnights     Order Specific Question:   Certification     Answer:   I certify that inpatient services are medically necessary for this patient for a duration of greater than two midnights. See H&P and MD Progress Notes for additional information about the patient's course of treatment.      ED Arrival Information     Expected   2023     Arrival   2023 22:53    Acuity   -            Means of arrival   Walk-In    Escorted by   Family Member    Service   OB/GYN    Admission type   Emergency            Arrival complaint   38 Weeks Pregnant/Induction Scheduled            Chief Complaint   Patient presents with   • Scheduled Induction       Initial Presentation: 19 y.o. female  at 41w2d Inpatient admission due to IOL for FGR; + marijuana use in pregnancy  EXAM  SVE: /-6 , Cephalic confirmed by TAUS , GBS status: negative   IOL  UDS,  Cl liq diet, IVF< IOL w IV Pit,  Epidural, AROM    2023 @ 0339   Viable Baby Girl apgars 8 & 9, BW= 2460 g (5 lb 6.8 oz)  Date: 2023   Day 2:   @ 1:48 PM  Oxytocin: 8 alison-units/min  Contraction Frequency (minutes): irregular  Contraction Quality: Mild  Cervical Dilation: 4  Cervical Effacement: 70  Fetal Station: -2   @ 5:57 PM  Oxytocin: 16 alison-units/min  Contraction Frequency (minutes): 1-3  Contraction Quality: Mild  Cervical Dilation: 5  Cervical Effacement: 80  Fetal Station: -2  AROM next check planned    Epidural  2023 9:52 PM;    DATE  2023  DAY 3   Dilation complete date/time: 2023 0332 2023 0332     2023 @ 0339   Viable Baby Girl apgars 8 & 9, BW= 2460 g (5 lb 6.8 oz)     Triage Vitals   Temperature Pulse Respirations Blood Pressure SpO2   23 0321 23 0321 23 0321 23 0321 23 032   97.8 °F (36.6 °C) 79 20 106/53 98 %      Temp Source Heart Rate Source Patient Position - Orthostatic VS BP Location FiO2 (%)   23 0321 23 1115 23 0321 23 032 --   Oral Monitor Lying Left arm       Pain Score       23 032       2          Wt Readings from Last 1 Encounters:   23 79.4 kg (175 lb)     Additional Vital Signs:   Date/Time Temp Pulse Resp BP SpO2 O2 Device Cardiac (WDL) Patient Position - Orthostatic VS   23 0906 98.6 °F (37 °C) 86 19 102/63 97 % -- -- Sitting   23 0834 -- -- -- -- -- None (Room air) WDL --   23 2329 98.3 °F (36.8 °C) 78 18 95/54 -- -- -- Sitting   23 2031 98.5 °F (36.9 °C) 80 18 106/58 -- -- WDL Lying   23 1700 98.3 °F (36.8 °C) 77 18 108/70 -- -- -- Sitting   23 1154 98.6 °F (37 °C) 78 18 85/46 Abnormal  -- -- -- Lying   23 0900 -- -- -- -- -- None (Room air) WDL      23 1200 -- -- -- 109/59 -- -- -- --   23 1115 98.5 °F (36.9 °C) 81 18 108/62 99 % -- -- Lying   23 1100 -- -- -- -- -- -- -- --   Comment rows:   OBSERV: pt brought down to labor room 204 at this time at 23 1100   23 1000 -- -- -- -- -- -- WDL --   23 0808 98.6 °F (37 °C) 78 20 100/64 99 % -- -- --   23 0321 97.8 °F (36.6 °C) 79 20 106/53 98 % None (Room air) -- Lying     Pertinent Labs/Diagnostic Test Results:   No orders to display         Results from last 7 days   Lab Units 23  0028   WBC Thousand/uL 14.86*   HEMOGLOBIN g/dL 12.1   HEMATOCRIT % 36.5   PLATELETS Thousands/uL 306           Results from last 7 days   Lab Units 23  0417   AMPH/METH  Negative   BARBITURATE UR  Negative   BENZODIAZEPINE UR  Negative   COCAINE UR  Negative METHADONE URINE  Negative   OPIATE UR  Negative   PCP UR  Negative   THC UR  Positive*         ED Treatment:   Medication Administration from 06/30/2023 2245 to 07/02/2023 1726       Date/Time Order Dose Route Action     07/01/2023 2318 EDT lactated ringers infusion 500 mL/hr Intravenous Rate/Dose Change     07/01/2023 2223 EDT lactated ringers infusion 125 mL/hr Intravenous New Bag     07/01/2023 2131 EDT lactated ringers infusion 999 mL/hr Intravenous Rate/Dose Change     07/01/2023 1932 EDT lactated ringers infusion 125 mL/hr Intravenous New Bag     07/01/2023 1711 EDT lactated ringers infusion 125 mL/hr Intravenous Rate/Dose Change     07/01/2023 1636 EDT lactated ringers infusion 999 mL/hr Intravenous Rate/Dose Change     07/01/2023 1543 EDT lactated ringers infusion 125 mL/hr Intravenous New Bag     07/01/2023 1321 EDT lactated ringers infusion 125 mL/hr Intravenous Rate/Dose Change     07/01/2023 1251 EDT lactated ringers infusion 999 mL/hr Intravenous Rate/Dose Change     07/01/2023 1140 EDT lactated ringers infusion 125 mL/hr Intravenous New Bag     07/01/2023 2243 EDT ondansetron (ZOFRAN) injection 4 mg 4 mg Intravenous Given     07/01/2023 1029 EDT ondansetron (ZOFRAN) injection 4 mg 4 mg Intravenous Given     07/02/2023 0349 EDT oxytocin (PITOCIN) 30 Units in lactated ringers 500 mL infusion 250 alison-units/min Intravenous Rate/Dose Change     07/02/2023 0345 EDT oxytocin (PITOCIN) 30 Units in lactated ringers 500 mL infusion 999 alison-units/min Intravenous Rate/Dose Change     07/02/2023 0340 EDT oxytocin (PITOCIN) 30 Units in lactated ringers 500 mL infusion 250 alison-units/min Intravenous Rate/Dose Change     07/02/2023 0142 EDT oxytocin (PITOCIN) 30 Units in lactated ringers 500 mL infusion 14 alison-units/min Intravenous Rate/Dose Change     07/01/2023 2317 EDT oxytocin (PITOCIN) 30 Units in lactated ringers 500 mL infusion 12 alison-units/min Intravenous Rate/Dose Change     07/01/2023 8196 EDT oxytocin (PITOCIN) 30 Units in lactated ringers 500 mL infusion 10 alison-units/min Intravenous Rate/Dose Change     07/01/2023 2047 EDT oxytocin (PITOCIN) 30 Units in lactated ringers 500 mL infusion 12 alison-units/min Intravenous Rate/Dose Change     07/01/2023 1908 EDT oxytocin (PITOCIN) 30 Units in lactated ringers 500 mL infusion 18 alison-units/min Intravenous Rate/Dose Change     07/01/2023 1626 EDT oxytocin (PITOCIN) 30 Units in lactated ringers 500 mL infusion 16 alison-units/min Intravenous Rate/Dose Change     07/01/2023 1544 EDT oxytocin (PITOCIN) 30 Units in lactated ringers 500 mL infusion 14 alison-units/min Intravenous Rate/Dose Change     07/01/2023 1453 EDT oxytocin (PITOCIN) 30 Units in lactated ringers 500 mL infusion 12 alison-units/min Intravenous Rate/Dose Change     07/01/2023 1418 EDT oxytocin (PITOCIN) 30 Units in lactated ringers 500 mL infusion 10 alison-units/min Intravenous Rate/Dose Change     07/01/2023 1321 EDT oxytocin (PITOCIN) 30 Units in lactated ringers 500 mL infusion 8 alison-units/min Intravenous Rate/Dose Change     07/01/2023 1249 EDT oxytocin (PITOCIN) 30 Units in lactated ringers 500 mL infusion 6 alison-units/min Intravenous Rate/Dose Change     07/01/2023 1217 EDT oxytocin (PITOCIN) 30 Units in lactated ringers 500 mL infusion 4 alison-units/min Intravenous Rate/Dose Change     07/01/2023 1148 EDT oxytocin (PITOCIN) 30 Units in lactated ringers 500 mL infusion -- Intravenous Canceled Entry     07/01/2023 1147 EDT oxytocin (PITOCIN) 30 Units in lactated ringers 500 mL infusion 2 alison-units/min Intravenous New Bag     07/01/2023 2248 EDT ropivacaine 0.2% PCEA -- Epidural New Bag     07/02/2023 1027 EDT docusate sodium (COLACE) capsule 100 mg 100 mg Oral Given     07/02/2023 1055 EDT acetaminophen (TYLENOL) tablet 650 mg 650 mg Oral Given     07/02/2023 0545 EDT ibuprofen (MOTRIN) tablet 600 mg 600 mg Oral Given     07/02/2023 0545 EDT benzocaine-menthol-lanolin-aloe (DERMOPLAST) 20-0.5 % topical spray 1 Application 1 Application Topical Given     07/02/2023 0545 EDT hydrocortisone 1 % cream 1 Application 1 Application Topical Given     07/02/2023 1043 EDT medroxyPROGESTERone (DEPO-PROVERA) IM injection 150 mg 150 mg Intramuscular Given        Past Medical History:   Diagnosis Date   • Asthma    • No known problems    • Varicella     immunized     Present on Admission:  • Marijuana use during pregnancy  • Asthma during pregnancy  • Poor fetal growth affecting management of mother in third trimester      Admitting Diagnosis: Encounter for induction of labor [Z34.90]  Encounter for full-term uncomplicated delivery [A64]  Age/Sex: 19 y.o. female  Admission Orders:  Continuous external uterine contraction & fetal HR monitoring  UDS  Consult IP CM     Scheduled Medications:  docusate sodium, 100 mg, Oral, BID  ibuprofen, 600 mg, Oral, Q6H      Continuous IV Infusions:  lactated ringers, 125 mL/hr, Intravenous, Continuous  oxytocin, 1-30 alison-units/min, Intravenous, Titrated  ropivacaine 0.2%, , Epidural, Continuous      PRN Meds:  acetaminophen, 650 mg, Oral, Q4H PRN  albuterol, 2 puff, Inhalation, Q6H PRN  benzocaine-menthol-lanolin-aloe, 1 Application, Topical, A1Z PRN  bupivacaine (PF), 30 mL, Infiltration, Once PRN  calcium carbonate, 1,000 mg, Oral, Daily PRN  hydrocortisone, 1 Application, Topical, Daily PRN  hydrOXYzine HCL, 10 mg, Oral, Q6H PRN  ondansetron, 4 mg, Intravenous, Q6H PRN  simethicone, 80 mg, Oral, 4x Daily PRN  witch hazel-glycerin, 1 Pad, Topical, Q4H PRN    IP CONSULT TO CASE MANAGEMENT  Network Utilization Review Department  ATTENTION: Please call with any questions or concerns to [phone] and carefully listen to the prompts so that you are directed to the right person.  All voicemails are confidential.  Theta Kira all requests for admission clinical reviews, approved or denied determinations and any other requests to dedicated fax number below belonging to the campus where the patient is receiving treatment.  List of dedicated fax numbers for the Facilities:  [place] DENIALS (Administrative/Medical Necessity) [phone] [address] (Maternity/NICU/Pediatrics) [phone]   [address] [place] [address] [phone]   [facility] [address] [facility] [phone] [address] [phone]   [address] [address] [phone]

## 2023-07-02 NOTE — ANESTHESIA PROCEDURE NOTES
Epidural Block    Patient location during procedure: OB  Start time: 7/1/2023 9:52 PM  Reason for block: procedure for pain and at surgeon's request  Staffing  Performed by: Sharla Villarreal MD  Authorized by: Sharla Villarreal MD    Preanesthetic Checklist  Completed: patient identified, IV checked, site marked, risks and benefits discussed, surgical consent, monitors and equipment checked, pre-op evaluation and timeout performed  Epidural  Patient position: sitting  Prep: ChloraPrep  Patient monitoring: cardiac monitor and frequent blood pressure checks  Approach: midline  Location: lumbar  Injection technique: ELI saline  Needle  Needle type: Tuohy   Needle gauge: 18 G  Catheter type: side hole  Catheter size: 20 G  Catheter at skin depth: 13 cm  Catheter securement method: stabilization device  Test dose: negative  Assessment  Number of attempts: 1negative aspiration for CSF, negative aspiration for heme and no paresthesia on injection  patient tolerated the procedure well with no immediate complications

## 2023-07-02 NOTE — PLAN OF CARE
Problem: BIRTH - VAGINAL/ SECTION  Goal: Fetal and maternal status remain reassuring during the birth process  Description: INTERVENTIONS:  - Monitor vital signs  - Monitor fetal heart rate  - Monitor uterine activity  - Monitor labor progression (vaginal delivery)  - DVT prophylaxis  - Antibiotic prophylaxis  Outcome: Progressing  Goal: Emotionally satisfying birthing experience for mother/fetus  Description: Interventions:  - Assess, plan, implement and evaluate the nursing care given to the patient in labor  - Advocate the philosophy that each childbirth experience is a unique experience and support the family's chosen level of involvement and control during the labor process   - Actively participate in both the patient's and family's teaching of the birth process  - Consider cultural, Yazidism and age-specific factors and plan care for the patient in labor  Outcome: Progressing     Problem: Nutrition/Hydration-ADULT  Goal: Nutrient/Hydration intake appropriate for improving, restoring or maintaining nutritional needs  Description: Monitor and assess patient's nutrition/hydration status for malnutrition. Collaborate with interdisciplinary team and initiate plan and interventions as ordered. Monitor patient's weight and dietary intake as ordered or per policy. Utilize nutrition screening tool and intervene as necessary. Determine patient's food preferences and provide high-protein, high-caloric foods as appropriate.      INTERVENTIONS:  - Monitor oral intake, urinary output, labs, and treatment plans  - Assess nutrition and hydration status and recommend course of action  - Evaluate amount of meals eaten  - Assist patient with eating if necessary   - Allow adequate time for meals  - Recommend/ encourage appropriate diets, oral nutritional supplements, and vitamin/mineral supplements  - Order, calculate, and assess calorie counts as needed  - Recommend, monitor, and adjust tube feedings and TPN/PPN based on assessed needs  - Assess need for intravenous fluids  - Provide specific nutrition/hydration education as appropriate  - Include patient/family/caregiver in decisions related to nutrition  Outcome: Progressing     Problem: PAIN - ADULT  Goal: Verbalizes/displays adequate comfort level or baseline comfort level  Description: Interventions:  - Encourage patient to monitor pain and request assistance  - Assess pain using appropriate pain scale  - Administer analgesics based on type and severity of pain and evaluate response  - Implement non-pharmacological measures as appropriate and evaluate response  - Consider cultural and social influences on pain and pain management  - Notify physician/advanced practitioner if interventions unsuccessful or patient reports new pain  Outcome: Progressing     Problem: INFECTION - ADULT  Goal: Absence or prevention of progression during hospitalization  Description: INTERVENTIONS:  - Assess and monitor for signs and symptoms of infection  - Monitor lab/diagnostic results  - Monitor all insertion sites, i.e. indwelling lines, tubes, and drains  - Monitor endotracheal if appropriate and nasal secretions for changes in amount and color  - [place] appropriate cooling/warming therapies per order  - Administer medications as ordered  - Instruct and encourage patient and family to use good hand hygiene technique  - Identify and instruct in appropriate isolation precautions for identified infection/condition  Outcome: Progressing  Goal: Absence of fever/infection during neutropenic period  Description: INTERVENTIONS:  - Monitor WBC    Outcome: Progressing     Problem: SAFETY ADULT  Goal: Patient will remain free of falls  Description: INTERVENTIONS:  - Educate patient/family on patient safety including physical limitations  - Instruct patient to call for assistance with activity   - Consult OT/PT to assist with strengthening/mobility   - Keep Call bell within reach  - Keep bed low and locked with side rails adjusted as appropriate  - Keep care items and personal belongings within reach  - Initiate and maintain comfort rounds  - Make Fall Risk Sign visible to staff  - Apply yellow socks and bracelet for high fall risk patients  - Consider moving patient to room near nurses station  Outcome: Progressing  Goal: Maintain or return to baseline ADL function  Description: INTERVENTIONS:  -  Assess patient's ability to carry out ADLs; assess patient's baseline for ADL function and identify physical deficits which impact ability to perform ADLs (bathing, care of mouth/teeth, toileting, grooming, dressing, etc.)  - Assess/evaluate cause of self-care deficits   - Assess range of motion  - Assess patient's mobility; develop plan if impaired  - Assess patient's need for assistive devices and provide as appropriate  - Encourage maximum independence but intervene and supervise when necessary  - Involve family in performance of ADLs  - Assess for home care needs following discharge   - Consider OT consult to assist with ADL evaluation and planning for discharge  - Provide patient education as appropriate  Outcome: Progressing  Goal: Maintains/Returns to pre admission functional level  Description: INTERVENTIONS:  - Perform BMAT or MOVE assessment daily.   - Set and communicate daily mobility goal to care team and patient/family/caregiver.    - Collaborate with rehabilitation services on mobility goals if consulted  - Out of bed for toileting  - Record patient progress and toleration of activity level   Outcome: Progressing     Problem: DISCHARGE PLANNING  Goal: Discharge to home or other facility with appropriate resources  Description: INTERVENTIONS:  - Identify barriers to discharge w/patient and caregiver  - Arrange for needed discharge resources and transportation as appropriate  - Identify discharge learning needs (meds, wound care, etc.)  - Arrange for interpretive services to assist at discharge as needed  - Refer to Case Management Department for coordinating discharge planning if the patient needs post-hospital services based on physician/advanced practitioner order or complex needs related to functional status, cognitive ability, or social support system  Outcome: Progressing     Problem: POSTPARTUM  Goal: Experiences normal postpartum course  Description: INTERVENTIONS:  - Monitor maternal vital signs  - Assess uterine involution and lochia  Outcome: Progressing  Goal: Appropriate maternal -  bonding  Description: INTERVENTIONS:  - Identify family support  - Assess for appropriate maternal/infant bonding   -Encourage maternal/infant bonding opportunities  - Referral to  or  as needed  Outcome: Progressing  Goal: Establishment of infant feeding pattern  Description: INTERVENTIONS:  - Assess breast/bottle feeding  - Refer to lactation as needed  Outcome: Progressing  Goal: Incision(s), wounds(s) or drain site(s) healing without S/S of infection  Description: INTERVENTIONS  - Assess and document dressing, incision, wound bed, drain sites and surrounding tissue  - Provide patient and family education  Outcome: Progressing     Problem: Knowledge Deficit  Goal: Patient/family/caregiver demonstrates understanding of disease process, treatment plan, medications, and discharge instructions  Description: Complete learning assessment and assess knowledge base.   Interventions:  - Provide teaching at level of understanding  - Provide teaching via preferred learning methods  Outcome: Progressing

## 2023-07-02 NOTE — OB LABOR/OXYTOCIN SAFETY PROGRESS
Oxytocin Safety Progress Check Note - Austine Music 19 y.o. female MRN: [id number]    Unit/Bed#: -01 Encounter: [id number]    Dose (alison-units/min) Oxytocin: 14 alison-units/min  Contraction Frequency (minutes): 1-2.5  Contraction Quality: Mild, Moderate  Tachysystole: No   Cervical Dilation: 6        Cervical Effacement: 90  Fetal Station: -1  Baseline Rate: 125 bpm  Fetal Heart Rate: 123 BPM  FHR Category: Category I               Vital Signs:   Vitals:    07/02/23 0033   BP: (!) 83/44   Pulse: 79   Resp:    Temp:    SpO2:        Notes/comments:   Patient uncomfortable with how epidural makes her feel. She is considering having it removed. SVE as above. Tracing category 1. Continue pitocin titration.      Efrain Matos MD 7/2/2023 1:55 AM

## 2023-07-02 NOTE — L&D DELIVERY NOTE
OBGYN Vaginal Delivery Summary  Timothy Schaffer 19 y.o. female MRN: [id number]  Unit/Bed#: -01 Encounter: [id number]    Predelivery Diagnosis:  1. Pregnancy at 38w  2. FGR     Postdelivery Diagnosis:  1. Same as above  2. Delivery of term     Procedure: spontaneous vaginal delivery    Attending: Dr. Girma Stevenson    Assistant: Dr. Serafin Shetty, Dr. Jero Kwon    Anesthesia: Epidural    QBL: 0 mL  Admission H.1 g/dL  Admission platelets: [id number]    Complications: none apparent    Specimens: cord blood, arterial and venous cord blood gases, placenta to pathology    Findings:   1. Viable female at 0, with APGARS of 8 and 9 at 1 and 5 minutes respectively. Weight pending at time of dictation. 2. Spontaneous delivery of intact placenta at 0343. central insertion 3 vessel umbilical cord  3. No lacerations   4. Blood gases:  Umbilical Cord Venous Blood Gas:      Umbilical Cord Arterial Blood Gas:  Results from last 7 days   Lab Units 23  0344   PH COA  7.260   PCO2 COA  63.0*   PO2 COA mm HG 15.4   HCO3 COA mmol/L 27.6*   BASE EXC COA mmol/L -1.1*   O2 CONTENT CORD ART ml/dl 5.9   O2 HGB, ARTERIAL CORD % 26.8       Disposition:  Patient tolerated the procedure well and was recovering in labor and delivery room. Brief history and labor course:  Timothy Schaffer is a 19 y.o.  at 41wk8d. She presented to labor and delivery for induction of labor secondary to FGR. At presentation she was 4//-2. Pitocin was started for augmentation. She received an epidural for analgesia. She progressed to complete cervical dilation and began pushing. Description of procedure  After pushing for 3 minutes, the patient delivered a viable female  at 0339 on 2023, weight pending at time of dictation, apgars of 8 (1 min) and 9 (5 min). The fetal vertex delivered spontaneously. Baby restituted  to JESSICA. There was a nuchal cord which was delivered through and then reduced.  The anterior (left) shoulder delivered atraumatically with maternal expulsive forces and the assistance of gentle downward traction. The posterior shoulder delivered with maternal expulsive forces and the assistance of gentle upward traction. The remainder of the fetus delivered spontaneously. Upon delivery the infant was placed on the mother's abdomen and delayed cord clamping was performed. The umbilical cord was then doubly clamped and cut. The infant was noted to cry spontaneously and was moving all extremities appropriately. There was no evidence for injury. Awaiting nurse resuscitators evaluated the . Arterial and venous cord blood gases and cord blood were collected for analysis and were promptly sent to the lab. In the immediate post-partum, IV pitocin was administered, and the uterus was noted to contract down well with massage and pitocin. The placenta delivered spontaneously at 0343 and was noted to be intact and had a centrally inserted 3 vessel cord. The placenta was sent to pathology. The vagina, cervix, perineum, and rectum were inspected. No laceration(s) were noted. At the conclusion of the procedure, all needle, sponge, and instrument counts were noted to be correct. Patient tolerated the procedure well and was allowed to recover in labor and delivery room with family and  before being transferred to the post-partum floor. Dr. Shakeel Thibodeaux and Dr. Jean Pierre Yin were present and participated in all key portions of the case.     Liv Bey MD  2023  3:57 AM

## 2023-07-02 NOTE — PLAN OF CARE
Problem: Knowledge Deficit  Goal: Verbalizes understanding of labor plan  Description: Assess patient/family/caregiver's baseline knowledge level and ability to understand information. Provide education via patient/family/caregiver's preferred learning method at appropriate level of understanding. 1. Provide teaching at level of understanding. 2. Provide teaching via preferred learning method(s). 7/1/2023 2014 by Jacquelin David RN  Outcome: Progressing  7/1/2023 2013 by Jacquelin David RN  Outcome: Progressing  Goal: Patient/family/caregiver demonstrates understanding of disease process, treatment plan, medications, and discharge instructions  Description: Complete learning assessment and assess knowledge base. Interventions:  - Provide teaching at level of understanding  - Provide teaching via preferred learning methods  7/1/2023 2014 by Jacquelin David RN  Outcome: Progressing  7/1/2023 2013 by Jacquelin David RN  Outcome: Progressing     Problem: Labor & Delivery  Goal: Manages discomfort  Description: Assess and monitor for signs and symptoms of discomfort. Assess patient's pain level regularly and per hospital policy. Administer medications as ordered. Support use of nonpharmacological methods to help control pain such as distraction, imagery, relaxation, and application of heat and cold. Collaborate with interdisciplinary team and patient to determine appropriate pain management plan. 1. Include patient in decisions related to comfort. 2. Offer non-pharmacological pain management interventions. 3. Report ineffective pain management to physician. 7/1/2023 2014 by Jacquelin David RN  Outcome: Progressing  7/1/2023 2013 by Jacquelin David RN  Outcome: Progressing  Goal: Patient vital signs are stable  Description: 1. Assess vital signs - vaginal delivery.   7/1/2023 2014 by Jacquelin David RN  Outcome: Progressing  7/1/2023 2013 by Jacquelin David RN  Outcome: Progressing     Problem: BIRTH - VAGINAL/ SECTION  Goal: Fetal and maternal status remain reassuring during the birth process  Description: INTERVENTIONS:  - Monitor vital signs  - Monitor fetal heart rate  - Monitor uterine activity  - Monitor labor progression (vaginal delivery)  - DVT prophylaxis  - Antibiotic prophylaxis  2023 by Jv West RN  Outcome: Progressing  2023 by Jv West RN  Outcome: Progressing  Goal: Emotionally satisfying birthing experience for mother/fetus  Description: Interventions:  - Assess, plan, implement and evaluate the nursing care given to the patient in labor  - Advocate the philosophy that each childbirth experience is a unique experience and support the family's chosen level of involvement and control during the labor process   - Actively participate in both the patient's and family's teaching of the birth process  - Consider cultural, Mu-ism and age-specific factors and plan care for the patient in labor  2023 by Jv West RN  Outcome: Progressing  2023 by Jv West RN  Outcome: Progressing     Problem: Nutrition/Hydration-ADULT  Goal: Nutrient/Hydration intake appropriate for improving, restoring or maintaining nutritional needs  Description: Monitor and assess patient's nutrition/hydration status for malnutrition. Collaborate with interdisciplinary team and initiate plan and interventions as ordered. Monitor patient's weight and dietary intake as ordered or per policy. Utilize nutrition screening tool and intervene as necessary. Determine patient's food preferences and provide high-protein, high-caloric foods as appropriate.      INTERVENTIONS:  - Monitor oral intake, urinary output, labs, and treatment plans  - Assess nutrition and hydration status and recommend course of action  - Evaluate amount of meals eaten  - Assist patient with eating if necessary   - Allow adequate time for meals  - Recommend/ encourage appropriate diets, oral nutritional supplements, and vitamin/mineral supplements  - Order, calculate, and assess calorie counts as needed  - Recommend, monitor, and adjust tube feedings and TPN/PPN based on assessed needs  - Assess need for intravenous fluids  - Provide specific nutrition/hydration education as appropriate  - Include patient/family/caregiver in decisions related to nutrition  7/1/2023 2014 by Lisa Roberts RN  Outcome: Progressing  7/1/2023 2013 by Lisa Roberts RN  Outcome: Progressing     Problem: PAIN - ADULT  Goal: Verbalizes/displays adequate comfort level or baseline comfort level  Description: Interventions:  - Encourage patient to monitor pain and request assistance  - Assess pain using appropriate pain scale  - Administer analgesics based on type and severity of pain and evaluate response  - Implement non-pharmacological measures as appropriate and evaluate response  - Consider cultural and social influences on pain and pain management  - Notify physician/advanced practitioner if interventions unsuccessful or patient reports new pain  7/1/2023 2014 by Lisa Roberts RN  Outcome: Progressing  7/1/2023 2013 by Lisa Roberts RN  Outcome: Progressing     Problem: INFECTION - ADULT  Goal: Absence or prevention of progression during hospitalization  Description: INTERVENTIONS:  - Assess and monitor for signs and symptoms of infection  - Monitor lab/diagnostic results  - Monitor all insertion sites, i.e. indwelling lines, tubes, and drains  - Monitor endotracheal if appropriate and nasal secretions for changes in amount and color  - [place] appropriate cooling/warming therapies per order  - Administer medications as ordered  - Instruct and encourage patient and family to use good hand hygiene technique  - Identify and instruct in appropriate isolation precautions for identified infection/condition  7/1/2023 2014 by Lisa Roberts RN  Outcome: Progressing  7/1/2023 2013 by Lisa Roberts RN  Outcome: Progressing  Goal: Absence of fever/infection during neutropenic period  Description: INTERVENTIONS:  - Monitor WBC    7/1/2023 2014 by Jv West RN  Outcome: Progressing  7/1/2023 2013 by Jv West RN  Outcome: Progressing     Problem: SAFETY ADULT  Goal: Patient will remain free of falls  Description: INTERVENTIONS:  - Educate patient/family on patient safety including physical limitations  - Instruct patient to call for assistance with activity   - Consult OT/PT to assist with strengthening/mobility   - Keep Call bell within reach  - Keep bed low and locked with side rails adjusted as appropriate  - Keep care items and personal belongings within reach  - Initiate and maintain comfort rounds  - Make Fall Risk Sign visible to staff  - Apply yellow socks and bracelet for high fall risk patients  - Consider moving patient to room near nurses station  7/1/2023 2014 by Jv West RN  Outcome: Progressing  7/1/2023 2013 by Jv West RN  Outcome: Progressing  Goal: Maintain or return to baseline ADL function  Description: INTERVENTIONS:  -  Assess patient's ability to carry out ADLs; assess patient's baseline for ADL function and identify physical deficits which impact ability to perform ADLs (bathing, care of mouth/teeth, toileting, grooming, dressing, etc.)  - Assess/evaluate cause of self-care deficits   - Assess range of motion  - Assess patient's mobility; develop plan if impaired  - Assess patient's need for assistive devices and provide as appropriate  - Encourage maximum independence but intervene and supervise when necessary  - Involve family in performance of ADLs  - Assess for home care needs following discharge   - Consider OT consult to assist with ADL evaluation and planning for discharge  - Provide patient education as appropriate  7/1/2023 2014 by Jv West RN  Outcome: Progressing  7/1/2023 2013 by Jv West RN  Outcome: Progressing  Goal: Maintains/Returns to pre admission functional level  Description: INTERVENTIONS:  - Perform BMAT or MOVE assessment daily.   - Set and communicate daily mobility goal to care team and patient/family/caregiver.    - Collaborate with rehabilitation services on mobility goals if consulted  - Out of bed for toileting  - Record patient progress and toleration of activity level   7/1/2023 2014 by Carole Mata RN  Outcome: Progressing  7/1/2023 2013 by Carole Mata RN  Outcome: Progressing     Problem: DISCHARGE PLANNING  Goal: Discharge to home or other facility with appropriate resources  Description: INTERVENTIONS:  - Identify barriers to discharge w/patient and caregiver  - Arrange for needed discharge resources and transportation as appropriate  - Identify discharge learning needs (meds, wound care, etc.)  - Arrange for interpretive services to assist at discharge as needed  - Refer to Case Management Department for coordinating discharge planning if the patient needs post-hospital services based on physician/advanced practitioner order or complex needs related to functional status, cognitive ability, or social support system  7/1/2023 2014 by Carole Mata RN  Outcome: Progressing  7/1/2023 2013 by Carole Mata RN  Outcome: Progressing

## 2023-07-02 NOTE — ANESTHESIA POSTPROCEDURE EVALUATION
Post-Op Assessment Note    CV Status:  Stable       Mental Status:  Awake   Hydration Status:  Stable   Airway Patency:  Patent       Staff: Anesthesiologist         No notable events documented.

## 2023-07-02 NOTE — ANESTHESIA PREPROCEDURE EVALUATION
Procedure:  LABOR ANALGESIA    Relevant Problems   GI/HEPATIC   (+) Gastroesophageal reflux during pregnancy in second trimester, antepartum      GYN   (+) 38 weeks gestation of pregnancy      PULMONARY   (+) Asthma during pregnancy        Physical Exam    Airway    Mallampati score: I  TM Distance: >3 FB  Neck ROM: full     Dental   No notable dental hx     Cardiovascular      Pulmonary      Other Findings        Anesthesia Plan  ASA Score- 2     Anesthesia Type- epidural with ASA Monitors. Additional Monitors:   Airway Plan:           Plan Factors-Exercise tolerance (METS): >4 METS. Chart reviewed. Existing labs reviewed. Patient summary reviewed. Induction-     Postoperative Plan-     Informed Consent- Anesthetic plan and risks discussed with patient. I personally reviewed this patient with the CRNA. Discussed and agreed on the Anesthesia Plan with the CRNA. Mumtaz Weir

## 2023-07-02 NOTE — DISCHARGE SUMMARY
Obstetrics Discharge Summary  Ernie Hay 19 y.o. female MRN: [id number]  Unit/Bed#: -01 Encounter: [id number]    Admission Date: 2023     Discharge Date: 7/3/23    Admitting Attending: Jerardo Sidhu  Delivery Attending: Gaby Record  Discharging Attending: Beryl Bradford    Admitting Diagnoses:   Pregnancy at 38w  [address]    Discharge Diagnoses:   Same, delivered      Procedures: spontaneous vaginal delivery    Anesthesia: epidural    [address] Cayden Garcia is a 19 y.o.  at 41wk8d. She presented to labor and delivery for induction of labor secondary to FGR. At presentation she was 4/70/-2. Pitocin was started for augmentation. She received an epidural for analgesia. She progressed to complete cervical dilation and began pushing. On 23 she delivered a viable female  38w6d via normal spontaneous vaginal delivery. She sustained no laceration during delivery. 's birth weight was 5lbs 6oz; Apgars were 8 (1 min) and 9 (5 min).  was admitted to the  nursery. Patient tolerated the procedure well. Her post-delivery course was uncomplicated. Her postpartum pain was well controlled with oral analgesics. Maternal blood type is O positive so RhoGAM was not indicated. On day of discharge, she was ambulating and able to reasonably perform all ADLs. She was voiding and had appropriate bowel function. Pain was well controlled. She was discharged home on postpartum day #1 without complications. Patient was instructed to follow up with her OBGYN as an outpatient and was given appropriate warnings to call provider if she develops signs of infection or uncontrolled pain. Complications: none apparent    Condition at discharge: good     Provisions for Follow-Up Care:  Please see after visit summary for information related to follow-up care and any pertinent home health orders.       Disposition: Home    Planned Readmission: No    Discharge Medications:   Please see AVS for a complete list of discharge medications. Discharge instructions :   Please see AVS for complete discharge instructions.

## 2023-07-03 VITALS
DIASTOLIC BLOOD PRESSURE: 63 MMHG | HEART RATE: 86 BPM | RESPIRATION RATE: 19 BRPM | TEMPERATURE: 98.6 F | OXYGEN SATURATION: 97 % | SYSTOLIC BLOOD PRESSURE: 102 MMHG

## 2023-07-03 PROCEDURE — 99024 POSTOP FOLLOW-UP VISIT: CPT | Performed by: STUDENT IN AN ORGANIZED HEALTH CARE EDUCATION/TRAINING PROGRAM

## 2023-07-03 RX ORDER — IBUPROFEN 600 MG/1
600 TABLET ORAL EVERY 6 HOURS
Qty: 30 TABLET | Refills: 0 | Status: SHIPPED | OUTPATIENT
Start: 2023-07-03

## 2023-07-03 RX ORDER — ACETAMINOPHEN 325 MG/1
650 TABLET ORAL EVERY 4 HOURS PRN
Refills: 0
Start: 2023-07-03

## 2023-07-03 RX ADMIN — ACETAMINOPHEN 650 MG: 325 TABLET, FILM COATED ORAL at 08:34

## 2023-07-03 RX ADMIN — DOCUSATE SODIUM 100 MG: 100 CAPSULE, LIQUID FILLED ORAL at 08:34

## 2023-07-03 RX ADMIN — IBUPROFEN 600 MG: 600 TABLET, FILM COATED ORAL at 09:29

## 2023-07-03 NOTE — CASE MANAGEMENT
Case Management Progress Note    Patient name Madyson Go  Location /-41 MRN [id number]  : 2005 Date 7/3/2023       LOS (days): 3  Geometric Mean LOS (GMLOS) (days):   Days to GMLOS:        OBJECTIVE:        Current admission status: Inpatient  Preferred Pharmacy:   [address] [place] [address] 35, 225 [address] [address]  Phone: [phone] Fax: [phone]    Primary Care Provider: No primary care provider on file. Primary Insurance: Wesley HUNT  Secondary Insurance:     PROGRESS NOTE:      CM received consult for MOB requesting Spectra S2 pump for home use. Pump ordered from [address] [place] [address] via [employer] and delivered to bedside my SP liaison David Crow.

## 2023-07-03 NOTE — LACTATION NOTE
This note was copied from a baby's chart. CONSULT - LACTATION  Baby Girl Elias Tran 1 days female MRN: [id number]    [address] Room / Bed:  301(N)/ 301(N) Encounter: [id number]    Maternal Information     MOTHER:  Alexa Friend  Maternal Age: 19 y.o.   OB History: # 1 - Date: 23, Sex: Female, Weight: 2460 g (5 lb 6.8 oz), GA: 38w6d, Delivery: Vaginal, Spontaneous, Apgar1: 8, Apgar5: 9, Living: Living, Birth Comments: None   Previouse breast reduction surgery? No    Lactation history:   Has patient previously breast fed: How long had patient previously breast fed:     Previous breast feeding complications:       Past Surgical History:   Procedure Laterality Date   • NO PAST SURGERIES          Birth information:  YOB: 2025   Time of birth: 3:39 AM   Sex: female   Delivery type: Vaginal, Spontaneous   Birth Weight: 2460 g (5 lb 6.8 oz)   Percent of Weight Change: -3%     Gestational Age: 37w9d   [unfilled]    Assessment     Breast and nipple assessment: extra large breasts     Assessment: sga; sleeping in [place]    Feeding assessment: no clinical assessment  LATCH:  Latch: Audible Swallowing:     Type of Nipple:     Comfort (Breast/Nipple):     Hold (Positioning):     LATCH Score:            Feeding recommendations:  mix feeds as mom has been giving formula. mom states her feeding intention was breast & breast and formula in bottle. Mom states she attempted once at the breast, baby latched, then unlatched. Ed. On how to est. Milk supply. Ed. On positioning. Ed. On paced bottle feeding. RSB/DC Handouts provided and reviewed    Mom wants a S2 pump - order sent to     Offered to call baby and me or see latch - mom denies at this time    Handouts:    Mother-led latch,   1st 2 weeks,   Importance of Latch  Latch Checklist  Large Breasts,   WHO How to mix formula,   Pumping Log,   LPI,   Dry up Milk,  Increase Supply,  Paced bottle,  Hand expression,    Information on hand expression given. Discussed benefits of knowing how to manually express breast including stimulating milk supply, softening nipple for latch and evacuating breast in the event of engorgement. Mom is encouraged to place baby skin to skin for feedings. Skin to skin education provided for baby placement on mother's chest, baby only in diaper, blankets below shoulders on baby's back. Skin to skin is encouraged to continue at home for feedings and between feedings. Worked on positioning infant up at chest level and starting to feed infant with nose arriving at the nipple. Then, using areolar compression to achieve a deep latch that is comfortable and exchanges optimum amounts of milk. - Start feedings on breast that last feeding ended   - allow no more than 3 hours between breast feeding sessions   - time between feedings is counted from the beginning of the first feed to the beginning of the next feeding session    Reviewed early signs of hunger, including tensing of hands and shoulders - no need to wait for open eyes. Crying is a late hunger sign. If baby is crying, soothe baby first and then attempt to latch. Reviewed normal sucking patterns: transition from stimulation to nutritive to release or non-nutritive. The goal is to see and hear lots of swallowing. Reviewed normal nursing pattern: infant could latch on one breast up to 30 minutes or until releases on own. Signs of satiation is open hand with fingers that do not grab your finger. Discussed difference in sensation of non-nutritive v nutritive sucking    Met with mother. Provided mother with Ready, Set, Baby booklet. Discussed Skin to Skin contact an benefits to mom and baby. Talked about the delay of the first bath until baby has adjusted. Spoke about the benefits of rooming in. Feeding on cue and what that means for recognizing infant's hunger.  Avoidance of pacifiers for the first month discussed. Talked about exclusive breastfeeding for the first 6 months. Positioning and latch reviewed as well as showing images of other feeding positions. Discussed the properties of a good latch in any position. Reviewed hand/manual expression. Discussed s/s that baby is getting enough milk and some s/s that breastfeeding dyad may need further help. Gave information on common concerns, what to expect the first few weeks after delivery, preparing for other caregivers, and how partners can help. Resources for support also provided. Encouraged parents to call for assistance, questions, and concerns about breastfeeding. Extension provided. Provided education on growth spurts, when to introduce bottles; paced bottle feeding, and non-nutritive suck at the breast. Provided education on Signs of satiation. Encouraged to call lactation to observe a latch prior to discharge for reassurance. Encouraged to call baby and me with any questions and closely monitor output.       Dina Tan 7/3/2023 8:26 AM

## 2023-07-03 NOTE — PROGRESS NOTES
Obstetrics Progress Note  Zoey Molina 19 y.o. female MRN: [id number]  Unit/Bed#: -01 Encounter: [id number]    Assessment/Plan:  Postpartum Day #1 s/p uncomplicated . Stable. Baby in room. By issue:  *  (spontaneous vaginal delivery)  Assessment & Plan  Continue routine post partum care  Encourage ambulation and  Breastfeeding/pumping   DMPA (administered for contraception on 23)    Marijuana use during pregnancy  Assessment & Plan  UDS on admission THC positive  Follow up case management consult    Asthma during pregnancy  Assessment & Plan  Albuterol prn    Poor fetal growth affecting management of mother in third trimester  Assessment & Plan  Birth weight was 5lb 6.8oz    Anticipate discharge Today    Subjective/Objective   Chief Complaint:   Post delivery     Subjective:   Pain: controlled. Tolerating PO: yes. Voiding: yes. Flatus: yes . Ambulating: yes. Chest pain: no. Shortness of breath: no. Leg pain: no. Lochia: within normal limits. Infant feeding: breast and bottle. Objective:   Vitals:   Temp:  [98.3 °F (36.8 °C)-98.6 °F (37 °C)] 98.3 °F (36.8 °C)  HR:  [75-80] 78  Resp:  [18] 18  BP: ()/(46-71) 95/54       Intake/Output Summary (Last 24 hours) at 7/3/2023 0644  Last data filed at 2023 1001  Gross per 24 hour   Intake --   Output 400 ml   Net -400 ml       Lab Results   Component Value Date    WBC 14.86 (H) 2023    HGB 12.1 2023    HCT 36.5 2023    MCV 91 2023     2023       Physical Exam:   General: alert and oriented x3, in no apparent distress  Cardiovascular: regular rate  Pulmonary: normal effort  Abdomen: Soft, non-tender, non-distended, no rebound or guarding.  Uterine fundus firm and non-tender, -2 cm below the umbilicus   Extremities: Non tender     Jade Larkin MD  PGY-I, OBGYN  7/3/2023, 6:44 AM

## 2023-07-03 NOTE — PLAN OF CARE
Problem: BIRTH - VAGINAL/ SECTION  Goal: Fetal and maternal status remain reassuring during the birth process  Description: INTERVENTIONS:  - Monitor vital signs  - Monitor fetal heart rate  - Monitor uterine activity  - Monitor labor progression (vaginal delivery)  - DVT prophylaxis  - Antibiotic prophylaxis  2023 by Soumya Pineda  Outcome: Completed  2023 by Soumya Pineda  Outcome: Progressing  Goal: Emotionally satisfying birthing experience for mother/fetus  Description: Interventions:  - Assess, plan, implement and evaluate the nursing care given to the patient in labor  - Advocate the philosophy that each childbirth experience is a unique experience and support the family's chosen level of involvement and control during the labor process   - Actively participate in both the patient's and family's teaching of the birth process  - Consider cultural, Bahai and age-specific factors and plan care for the patient in labor  2023 by Soumya Pineda  Outcome: Completed  2023 by Soumya Pineda  Outcome: Progressing     Problem: POSTPARTUM  Goal: Experiences normal postpartum course  Description: INTERVENTIONS:  - Monitor maternal vital signs  - Assess uterine involution and lochia  2023 by Soumya Pineda  Outcome: Progressing  2023 by Soumya Pineda  Outcome: Progressing  Goal: Appropriate maternal -  bonding  Description: INTERVENTIONS:  - Identify family support  - Assess for appropriate maternal/infant bonding   -Encourage maternal/infant bonding opportunities  - Referral to  or  as needed  2023 by Soumya Pineda  Outcome: Progressing  2023 by Soumya Pineda  Outcome: Progressing  Goal: Establishment of infant feeding pattern  Description: INTERVENTIONS:  - Assess breast/bottle feeding  - Refer to lactation as needed  2023 by Soumya Pineda  Outcome: Progressing  2023 2204 by Cheryl Billy  Outcome: Progressing  Goal: Incision(s), wounds(s) or drain site(s) healing without S/S of infection  Description: INTERVENTIONS  - Assess and document dressing, incision, wound bed, drain sites and surrounding tissue  - Provide patient and family education  - Perform skin care/dressing changes every   7/2/2023 2205 by Cheryl Billy  Outcome: Progressing  7/2/2023 2204 by Cheryl Billy  Outcome: Progressing     Problem: Nutrition/Hydration-ADULT  Goal: Nutrient/Hydration intake appropriate for improving, restoring or maintaining nutritional needs  Description: Monitor and assess patient's nutrition/hydration status for malnutrition. Collaborate with interdisciplinary team and initiate plan and interventions as ordered. Monitor patient's weight and dietary intake as ordered or per policy. Utilize nutrition screening tool and intervene as necessary. Determine patient's food preferences and provide high-protein, high-caloric foods as appropriate.      INTERVENTIONS:  - Monitor oral intake, urinary output, labs, and treatment plans  - Assess nutrition and hydration status and recommend course of action  - Evaluate amount of meals eaten  - Assist patient with eating if necessary   - Allow adequate time for meals  - Recommend/ encourage appropriate diets, oral nutritional supplements, and vitamin/mineral supplements  - Order, calculate, and assess calorie counts as needed  - Recommend, monitor, and adjust tube feedings and TPN/PPN based on assessed needs  - Assess need for intravenous fluids  - Provide specific nutrition/hydration education as appropriate  - Include patient/family/caregiver in decisions related to nutrition  7/2/2023 2205 by Cheryl Billy  Outcome: Progressing  7/2/2023 2204 by Cheryl Billy  Outcome: Progressing     Problem: PAIN - ADULT  Goal: Verbalizes/displays adequate comfort level or baseline comfort level  Description: Interventions:  - Encourage patient to monitor pain and request assistance  - Assess pain using appropriate pain scale  - Administer analgesics based on type and severity of pain and evaluate response  - Implement non-pharmacological measures as appropriate and evaluate response  - Consider cultural and social influences on pain and pain management  - Notify physician/advanced practitioner if interventions unsuccessful or patient reports new pain  7/2/2023 2205 by Noralee Brunner  Outcome: Progressing  7/2/2023 2204 by Noralee Brunner  Outcome: Progressing     Problem: INFECTION - ADULT  Goal: Absence or prevention of progression during hospitalization  Description: INTERVENTIONS:  - Assess and monitor for signs and symptoms of infection  - Monitor lab/diagnostic results  - Monitor all insertion sites, i.e. indwelling lines, tubes, and drains  - Monitor endotracheal if appropriate and nasal secretions for changes in amount and color  - [place] appropriate cooling/warming therapies per order  - Administer medications as ordered  - Instruct and encourage patient and family to use good hand hygiene technique  - Identify and instruct in appropriate isolation precautions for identified infection/condition  7/2/2023 2205 by Noralee Brunner  Outcome: Progressing  7/2/2023 2204 by Noralee Brunner  Outcome: Progressing  Goal: Absence of fever/infection during neutropenic period  Description: INTERVENTIONS:  - Monitor WBC    7/2/2023 2205 by Noralee Brunner  Outcome: Progressing  7/2/2023 2204 by Noralee Brunner  Outcome: Progressing     Problem: SAFETY ADULT  Goal: Patient will remain free of falls  Description: INTERVENTIONS:  - Educate patient/family on patient safety including physical limitations  - Instruct patient to call for assistance with activity   - Consult OT/PT to assist with strengthening/mobility   - Keep Call bell within reach  - Keep bed low and locked with side rails adjusted as appropriate  - Keep care items and personal belongings within reach  - Initiate and maintain comfort rounds  - Make Fall Risk Sign visible to staff  - Apply yellow socks and bracelet for high fall risk patients  - Consider moving patient to room near nurses station  7/2/2023 2205 by Cheryl Billy  Outcome: Progressing  7/2/2023 2204 by Cheryl Billy  Outcome: Progressing  Goal: Maintain or return to baseline ADL function  Description: INTERVENTIONS:  -  Assess patient's ability to carry out ADLs; assess patient's baseline for ADL function and identify physical deficits which impact ability to perform ADLs (bathing, care of mouth/teeth, toileting, grooming, dressing, etc.)  - Assess/evaluate cause of self-care deficits   - Assess range of motion  - Assess patient's mobility; develop plan if impaired  - Assess patient's need for assistive devices and provide as appropriate  - Encourage maximum independence but intervene and supervise when necessary  - Involve family in performance of ADLs  - Assess for home care needs following discharge   - Consider OT consult to assist with ADL evaluation and planning for discharge  - Provide patient education as appropriate  7/2/2023 2205 by Cheryl Billy  Outcome: Progressing  7/2/2023 2204 by Cheryl Billy  Outcome: Progressing  Goal: Maintains/Returns to pre admission functional level  Description: INTERVENTIONS:  - Perform BMAT or MOVE assessment daily.   - Set and communicate daily mobility goal to care team and patient/family/caregiver.    - Collaborate with rehabilitation services on mobility goals if consulted  - Out of bed for toileting  - Record patient progress and toleration of activity level   7/2/2023 2205 by Cheryl Billy  Outcome: Progressing  7/2/2023 2204 by Cheryl Billy  Outcome: Progressing     Problem: DISCHARGE PLANNING  Goal: Discharge to home or other facility with appropriate resources  Description: INTERVENTIONS:  - Identify barriers to discharge w/patient and caregiver  - Arrange for needed discharge resources and transportation as appropriate  - Identify discharge learning needs (meds, wound care, etc.)  - Arrange for interpretive services to assist at discharge as needed  - Refer to Case Management Department for coordinating discharge planning if the patient needs post-hospital services based on physician/advanced practitioner order or complex needs related to functional status, cognitive ability, or social support system  7/2/2023 2205 by Alben Meckel  Outcome: Progressing  7/2/2023 2204 by Alben Meckel  Outcome: Progressing     Problem: Knowledge Deficit  Goal: Patient/family/caregiver demonstrates understanding of disease process, treatment plan, medications, and discharge instructions  Description: Complete learning assessment and assess knowledge base.   Interventions:  - Provide teaching at level of understanding  - Provide teaching via preferred learning methods  7/2/2023 2205 by Alben Meckel  Outcome: Progressing  7/2/2023 2204 by Alben Meckel  Outcome: Progressing

## 2023-07-03 NOTE — PLAN OF CARE
Problem: POSTPARTUM  Goal: Experiences normal postpartum course  Description: INTERVENTIONS:  - Monitor maternal vital signs  - Assess uterine involution and lochia  Outcome: Progressing  Goal: Appropriate maternal -  bonding  Description: INTERVENTIONS:  - Identify family support  - Assess for appropriate maternal/infant bonding   -Encourage maternal/infant bonding opportunities  - Referral to  or  as needed  Outcome: Progressing  Goal: Establishment of infant feeding pattern  Description: INTERVENTIONS:  - Assess breast/bottle feeding  - Refer to lactation as needed  Outcome: Progressing  Goal: Incision(s), wounds(s) or drain site(s) healing without S/S of infection  Description: INTERVENTIONS  - Assess and document dressing, incision, wound bed, drain sites and surrounding tissue  - Provide patient and family education  - Perform skin care/dressing changes every   Outcome: Progressing     Problem: Nutrition/Hydration-ADULT  Goal: Nutrient/Hydration intake appropriate for improving, restoring or maintaining nutritional needs  Description: Monitor and assess patient's nutrition/hydration status for malnutrition. Collaborate with interdisciplinary team and initiate plan and interventions as ordered. Monitor patient's weight and dietary intake as ordered or per policy. Utilize nutrition screening tool and intervene as necessary. Determine patient's food preferences and provide high-protein, high-caloric foods as appropriate.      INTERVENTIONS:  - Monitor oral intake, urinary output, labs, and treatment plans  - Assess nutrition and hydration status and recommend course of action  - Evaluate amount of meals eaten  - Assist patient with eating if necessary   - Allow adequate time for meals  - Recommend/ encourage appropriate diets, oral nutritional supplements, and vitamin/mineral supplements  - Order, calculate, and assess calorie counts as needed  - Recommend, monitor, and adjust tube feedings and TPN/PPN based on assessed needs  - Assess need for intravenous fluids  - Provide specific nutrition/hydration education as appropriate  - Include patient/family/caregiver in decisions related to nutrition  Outcome: Progressing     Problem: PAIN - ADULT  Goal: Verbalizes/displays adequate comfort level or baseline comfort level  Description: Interventions:  - Encourage patient to monitor pain and request assistance  - Assess pain using appropriate pain scale  - Administer analgesics based on type and severity of pain and evaluate response  - Implement non-pharmacological measures as appropriate and evaluate response  - Consider cultural and social influences on pain and pain management  - Notify physician/advanced practitioner if interventions unsuccessful or patient reports new pain  Outcome: Progressing     Problem: INFECTION - ADULT  Goal: Absence or prevention of progression during hospitalization  Description: INTERVENTIONS:  - Assess and monitor for signs and symptoms of infection  - Monitor lab/diagnostic results  - Monitor all insertion sites, i.e. indwelling lines, tubes, and drains  - Monitor endotracheal if appropriate and nasal secretions for changes in amount and color  - [place] appropriate cooling/warming therapies per order  - Administer medications as ordered  - Instruct and encourage patient and family to use good hand hygiene technique  - Identify and instruct in appropriate isolation precautions for identified infection/condition  Outcome: Progressing  Goal: Absence of fever/infection during neutropenic period  Description: INTERVENTIONS:  - Monitor WBC    Outcome: Progressing     Problem: SAFETY ADULT  Goal: Patient will remain free of falls  Description: INTERVENTIONS:  - Educate patient/family on patient safety including physical limitations  - Instruct patient to call for assistance with activity   - Consult OT/PT to assist with strengthening/mobility   - Keep Call bell within reach  - Keep bed low and locked with side rails adjusted as appropriate  - Keep care items and personal belongings within reach  - Initiate and maintain comfort rounds  - Make Fall Risk Sign visible to staff  - Offer Toileting every  Hours, in advance of need  - Initiate/Maintainalarm  - Obtain necessary fall risk management equipment:   - Apply yellow socks and bracelet for high fall risk patients  - Consider moving patient to room near nurses station  Outcome: Progressing  Goal: Maintain or return to baseline ADL function  Description: INTERVENTIONS:  -  Assess patient's ability to carry out ADLs; assess patient's baseline for ADL function and identify physical deficits which impact ability to perform ADLs (bathing, care of mouth/teeth, toileting, grooming, dressing, etc.)  - Assess/evaluate cause of self-care deficits   - Assess range of motion  - Assess patient's mobility; develop plan if impaired  - Assess patient's need for assistive devices and provide as appropriate  - Encourage maximum independence but intervene and supervise when necessary  - Involve family in performance of ADLs  - Assess for home care needs following discharge   - Consider OT consult to assist with ADL evaluation and planning for discharge  - Provide patient education as appropriate  Outcome: Progressing  Goal: Maintains/Returns to pre admission functional level  Description: INTERVENTIONS:  - Perform BMAT or MOVE assessment daily.   - Set and communicate daily mobility goal to care team and patient/family/caregiver. - Collaborate with rehabilitation services on mobility goals if consulted  - Perform Range of Motion times a day. - Reposition patient every hours.   - Dangle patient times a day  - Stand patient times a day  - Ambulate patient times a day  - Out of bed to chairtimes a day   - Out of bed for meals times a day  - Out of bed for toileting  - Record patient progress and toleration of activity level   Outcome: Progressing     Problem: DISCHARGE PLANNING  Goal: Discharge to home or other facility with appropriate resources  Description: INTERVENTIONS:  - Identify barriers to discharge w/patient and caregiver  - Arrange for needed discharge resources and transportation as appropriate  - Identify discharge learning needs (meds, wound care, etc.)  - Arrange for interpretive services to assist at discharge as needed  - Refer to Case Management Department for coordinating discharge planning if the patient needs post-hospital services based on physician/advanced practitioner order or complex needs related to functional status, cognitive ability, or social support system  Outcome: Progressing     Problem: Knowledge Deficit  Goal: Patient/family/caregiver demonstrates understanding of disease process, treatment plan, medications, and discharge instructions  Description: Complete learning assessment and assess knowledge base.   Interventions:  - Provide teaching at level of understanding  - Provide teaching via preferred learning methods  Outcome: Progressing

## 2023-07-03 NOTE — CASE MANAGEMENT
Case Management Progress Note    Patient name Lester Lambert  Location /-04 MRN [id number]  : 2005 Date 7/3/2023       LOS (days): 3  Geometric Mean LOS (GMLOS) (days):   Days to GMLOS:        OBJECTIVE:        Current admission status: Inpatient  Preferred Pharmacy:   [address] [place] [address] 36, [address] [facility] [address]  Phone: [phone] Fax: [phone]    Primary Care Provider: No primary care provider on file. Primary Insurance: Jose HUNT  Secondary Insurance:     PROGRESS NOTE:        CM s/w Natasha Gibson [facility]  () who states baby is okay to go home with MOB today. Nurse Richardson Laguna made aware.

## 2023-07-03 NOTE — CASE MANAGEMENT
Case Management Progress Note    Patient name Meryle Flash  Location /-66 MRN [id number]  : 2005 Date 7/3/2023       LOS (days): 3  Geometric Mean LOS (GMLOS) (days):   Days to GMLOS:        OBJECTIVE:        Current admission status: Inpatient  Preferred Pharmacy:   [address] [place] [address] 73, [address] [id number]  Phone: [phone] Fax: [phone]    Primary Care Provider: No primary care provider on file. Primary Insurance: Ana HUNT  Secondary Insurance:     PROGRESS NOTE:    CM received consult for MOB with hx THC during pregnancy. CM screened both MOB and baby charts. Both UDS results for MOB and Baby + for THC. CM met with MOB at bedside as well as her grandmother. Patient verbalizes consent to discussion with grandmother present. CM review + UDS results and MOB states "light THC use when her nausea medications didn't work." CM inquired if MOB had medical marijuana card and she states she was trying to obtain a card but couldn't d/t pregnancy. CM review need to make CYS report for + UDS results and MOB verbalizes understanding. CM reviewed process and expectation for clearance prior to baby d/c. MOB verbalizes understanding. CM entered CYS report through online portal. e-Referral ID: [id number]     Awaiting CYS clearance for baby to d/c home today.

## 2023-07-03 NOTE — PLAN OF CARE
Problem: POSTPARTUM  Goal: Experiences normal postpartum course  Description: INTERVENTIONS:  - Monitor maternal vital signs  - Assess uterine involution and lochia  7/3/2023 1539 by Elizabeth Sheppard RN  Outcome: Adequate for Discharge  7/3/2023 0845 by Elizabeth Sheppard RN  Outcome: Progressing  Goal: Appropriate maternal -  bonding  Description: INTERVENTIONS:  - Identify family support  - Assess for appropriate maternal/infant bonding   -Encourage maternal/infant bonding opportunities  - Referral to  or  as needed  7/3/2023 1539 by Elizabeth Sheppard RN  Outcome: Adequate for Discharge  7/3/2023 0845 by Elizabeth Sheppard RN  Outcome: Progressing  Goal: Establishment of infant feeding pattern  Description: INTERVENTIONS:  - Assess breast/bottle feeding  - Refer to lactation as needed  7/3/2023 1539 by Elizabeth Sheppard RN  Outcome: Adequate for Discharge  7/3/2023 0845 by Elizabeth Sheppard RN  Outcome: Progressing  Goal: Incision(s), wounds(s) or drain site(s) healing without S/S of infection  Description: INTERVENTIONS  - Assess and document dressing, incision, wound bed, drain sites and surrounding tissue  - Provide patient and family education  - Perform skin care/dressing changes every   7/3/2023 1539 by Elizabeth Sheppard RN  Outcome: Adequate for Discharge  7/3/2023 0845 by Elizabeth Sheppard RN  Outcome: Progressing     Problem: Nutrition/Hydration-ADULT  Goal: Nutrient/Hydration intake appropriate for improving, restoring or maintaining nutritional needs  Description: Monitor and assess patient's nutrition/hydration status for malnutrition. Collaborate with interdisciplinary team and initiate plan and interventions as ordered. Monitor patient's weight and dietary intake as ordered or per policy. Utilize nutrition screening tool and intervene as necessary. Determine patient's food preferences and provide high-protein, high-caloric foods as appropriate.      INTERVENTIONS:  - Monitor oral intake, urinary output, labs, and treatment plans  - Assess nutrition and hydration status and recommend course of action  - Evaluate amount of meals eaten  - Assist patient with eating if necessary   - Allow adequate time for meals  - Recommend/ encourage appropriate diets, oral nutritional supplements, and vitamin/mineral supplements  - Order, calculate, and assess calorie counts as needed  - Recommend, monitor, and adjust tube feedings and TPN/PPN based on assessed needs  - Assess need for intravenous fluids  - Provide specific nutrition/hydration education as appropriate  - Include patient/family/caregiver in decisions related to nutrition  7/3/2023 1539 by Danna Poole RN  Outcome: Adequate for Discharge  7/3/2023 0845 by Danna Poole RN  Outcome: Progressing     Problem: PAIN - ADULT  Goal: Verbalizes/displays adequate comfort level or baseline comfort level  Description: Interventions:  - Encourage patient to monitor pain and request assistance  - Assess pain using appropriate pain scale  - Administer analgesics based on type and severity of pain and evaluate response  - Implement non-pharmacological measures as appropriate and evaluate response  - Consider cultural and social influences on pain and pain management  - Notify physician/advanced practitioner if interventions unsuccessful or patient reports new pain  7/3/2023 1539 by Danna Poole RN  Outcome: Adequate for Discharge  7/3/2023 0845 by Danna Poole RN  Outcome: Progressing     Problem: INFECTION - ADULT  Goal: Absence or prevention of progression during hospitalization  Description: INTERVENTIONS:  - Assess and monitor for signs and symptoms of infection  - Monitor lab/diagnostic results  - Monitor all insertion sites, i.e. indwelling lines, tubes, and drains  - Monitor endotracheal if appropriate and nasal secretions for changes in amount and color  - [place] appropriate cooling/warming therapies per order  - Administer medications as ordered  - Instruct and encourage patient and family to use good hand hygiene technique  - Identify and instruct in appropriate isolation precautions for identified infection/condition  7/3/2023 1539 by Luan Van RN  Outcome: Adequate for Discharge  7/3/2023 0845 by Luan Van RN  Outcome: Progressing  Goal: Absence of fever/infection during neutropenic period  Description: INTERVENTIONS:  - Monitor WBC    7/3/2023 1539 by Luan Van RN  Outcome: Adequate for Discharge  7/3/2023 0845 by Luan Van RN  Outcome: Progressing     Problem: SAFETY ADULT  Goal: Patient will remain free of falls  Description: INTERVENTIONS:  - Educate patient/family on patient safety including physical limitations  - Instruct patient to call for assistance with activity   - Consult OT/PT to assist with strengthening/mobility   - Keep Call bell within reach  - Keep bed low and locked with side rails adjusted as appropriate  - Keep care items and personal belongings within reach  - Initiate and maintain comfort rounds  - Make Fall Risk Sign visible to staff  - Offer Toileting every Hours, in advance of need  - Initiate/Maintainalarm  - Obtain necessary fall risk management equipment:   - Apply yellow socks and bracelet for high fall risk patients  - Consider moving patient to room near nurses station  7/3/2023 1539 by Luan Van RN  Outcome: Adequate for Discharge  7/3/2023 0845 by Luan Van RN  Outcome: Progressing  Goal: Maintain or return to baseline ADL function  Description: INTERVENTIONS:  -  Assess patient's ability to carry out ADLs; assess patient's baseline for ADL function and identify physical deficits which impact ability to perform ADLs (bathing, care of mouth/teeth, toileting, grooming, dressing, etc.)  - Assess/evaluate cause of self-care deficits   - Assess range of motion  - Assess patient's mobility; develop plan if impaired  - Assess patient's need for assistive devices and provide as appropriate  - Encourage maximum independence but intervene and supervise when necessary  - Involve family in performance of ADLs  - Assess for home care needs following discharge   - Consider OT consult to assist with ADL evaluation and planning for discharge  - Provide patient education as appropriate  7/3/2023 1539 by Sandy Tatum RN  Outcome: Adequate for Discharge  7/3/2023 0845 by Sandy Tatum RN  Outcome: Progressing  Goal: Maintains/Returns to pre admission functional level  Description: INTERVENTIONS:  - Perform BMAT or MOVE assessment daily.   - Set and communicate daily mobility goal to care team and patient/family/caregiver. - Collaborate with rehabilitation services on mobility goals if consulted  - Perform Range of Motion  times a day. - Reposition patient every hours.   - Dangle patient  times a day  - Stand patient  times a day  - Ambulate patient times a day  - Out of bed to chair times a day   - Out of bed for meals  times a day  - Out of bed for toileting  - Record patient progress and toleration of activity level   7/3/2023 1539 by Sandy Tatum RN  Outcome: Adequate for Discharge  7/3/2023 0845 by Sandy Tatum RN  Outcome: Progressing     Problem: DISCHARGE PLANNING  Goal: Discharge to home or other facility with appropriate resources  Description: INTERVENTIONS:  - Identify barriers to discharge w/patient and caregiver  - Arrange for needed discharge resources and transportation as appropriate  - Identify discharge learning needs (meds, wound care, etc.)  - Arrange for interpretive services to assist at discharge as needed  - Refer to Case Management Department for coordinating discharge planning if the patient needs post-hospital services based on physician/advanced practitioner order or complex needs related to functional status, cognitive ability, or social support system  7/3/2023 1539 by Sandy Tatum RN  Outcome: Adequate for Discharge  7/3/2023 0845 by Sandy Tatum RN  Outcome: Progressing     Problem: Knowledge Deficit  Goal: Patient/family/caregiver demonstrates understanding of disease process, treatment plan, medications, and discharge instructions  Description: Complete learning assessment and assess knowledge base.   Interventions:  - Provide teaching at level of understanding  - Provide teaching via preferred learning methods  7/3/2023 1539 by Yolette Dominguez RN  Outcome: Adequate for Discharge  7/3/2023 0845 by Yolette Dominguez RN  Outcome: Progressing

## 2023-07-05 NOTE — UTILIZATION REVIEW
NOTIFICATION OF INPATIENT ADMISSION   MATERNITY/DELIVERY AUTHORIZATION REQUEST   SERVICING FACILITY:   [address] [place] [address] - L&D, , NICU  [facility]. [facility], [address] [place] [address]  Tax ID: [id number]  NPI: [id number]   ATTENDING PROVIDER:  Attending Name and NPI#: Leah Diaz Daniayancy [[id number]]  Address: [address] [place] [address], [address] [place] [address]  Phone: [phone]   ADMISSION INFORMATION:  [facility]   Place of Service Code: 21  Inpatient Admission Date/Time: 23 10:53 PM  Discharge Date/Time: 7/3/2023  3:40 PM  Admitting Diagnosis Code/Description:  Encounter for induction of labor [Z34.90]  Encounter for full-term uncomplicated delivery [W69]     Mother: Nora Gonsalez 2005 Estimated Date of Delivery: 7/10/23  Delivering clinician: Lula Nunes    OB History        1    Para   1    Term   1            AB        Living   1       SAB        IAB        Ectopic        Multiple   0    Live Births   1               [place] Name & MRN:   Information for the patient's :  Glendy Castañeda Girl Lubna Abbot) [[id number]]      Delivery Information:  Sex: female  Delivered 2023 3:39 AM by Vaginal, Spontaneous; Gestational Age: 37w9d    [place] Measurements:  Weight: 5 lb 6.8 oz (2460 g); Height: 18.5"    APGAR 1 minute 5 minutes 10 minutes   Totals: 8 9      [place] Birth Information: 19 y.o. female MRN: [id number] Unit/Bed#: -01   Birthweight: No birth weight on file. Gestational Age: <None> Delivery Type:    APGARS Totals:        UTILIZATION REVIEW CONTACT:  Glenis Wing, Utilization   Network Utilization Review Department  Phone: [phone]  Fax [phone]  Email: [email or web address]  Contact for approvals/pending authorizations, clinical reviews, and discharge.      PHYSICIAN ADVISORY SERVICES:  Medical Necessity Denial & [id number] Review  Phone: [phone] Fax: [phone]  Email: [email or web address]

## 2023-07-06 PROCEDURE — 88307 TISSUE EXAM BY PATHOLOGIST: CPT | Performed by: PATHOLOGY

## 2023-07-11 ENCOUNTER — TELEPHONE (OUTPATIENT)
Dept: OBGYN CLINIC | Facility: CLINIC | Age: 18
End: 2023-07-11

## 2023-07-11 NOTE — TELEPHONE ENCOUNTER
Attempted to call pt to complete TCM and schedule PPD appt on or after 7/23.  Unable to LVM - phone # not accepting calls at this time    My chart message for pt to call back [address] [facility] ASAP

## 2023-07-31 ENCOUNTER — PATIENT OUTREACH (OUTPATIENT)
Dept: OBGYN CLINIC | Facility: CLINIC | Age: 18
End: 2023-07-31

## 2023-07-31 NOTE — PROGRESS NOTES
EDVIN LEWIS spoke with Pt on her request. Pt is 4 weeks post partum and reported her daughter is well. Baby is feeding by bottle. Pt reported she has been dealing with some mood swings, anger and irritability, no SI / HI, sleeping is fair but her appetite is low. Pt denies any prior Hx or Depression. [place] [address] [facility] [place] evaluation. EDVIN LEWIS offered the information for local resources or assistance to make the appointment. Pt requested EDVIN LEWIS to help with the appointment. EDVIN LEWIS called [facility] and was informed that Pt has 2 insurance and they can not see her. Pt was notify of that and was advice to contact [facility] to address that. Per GM that is a secondary insurance that she has and would like to find a provider that will see her without dropping it. EDVIN LEWIS called [facility] and left message to refer Pt for their waiting list. Pt was also referred to The [address] [place] [address] and they will call Pt to schedule the appointment.

## 2023-08-24 ENCOUNTER — PATIENT OUTREACH (OUTPATIENT)
Dept: OBGYN CLINIC | Facility: CLINIC | Age: 18
End: 2023-08-24

## 2023-10-22 ENCOUNTER — HOSPITAL ENCOUNTER (EMERGENCY)
Facility: HOSPITAL | Age: 18
Discharge: HOME | End: 2023-10-22
Attending: EMERGENCY MEDICINE
Payer: MEDICARE

## 2023-10-22 VITALS
DIASTOLIC BLOOD PRESSURE: 92 MMHG | RESPIRATION RATE: 18 BRPM | TEMPERATURE: 97.1 F | SYSTOLIC BLOOD PRESSURE: 127 MMHG | HEART RATE: 103 BPM | OXYGEN SATURATION: 98 %

## 2023-10-22 DIAGNOSIS — Z87.09 HISTORY OF ASTHMA: ICD-10-CM

## 2023-10-22 DIAGNOSIS — R09.81 NASAL CONGESTION: Primary | ICD-10-CM

## 2023-10-22 DIAGNOSIS — R05.9 COUGH: ICD-10-CM

## 2023-10-22 DIAGNOSIS — B34.9 VIRAL ILLNESS: ICD-10-CM

## 2023-10-22 PROCEDURE — 99283 EMERGENCY DEPT VISIT LOW MDM: CPT

## 2023-10-22 PROCEDURE — 99284 EMERGENCY DEPT VISIT MOD MDM: CPT | Performed by: EMERGENCY MEDICINE

## 2023-10-22 RX ORDER — FLUTICASONE PROPIONATE 50 MCG
1 SPRAY, SUSPENSION (ML) NASAL DAILY
Status: DISCONTINUED | OUTPATIENT
Start: 2023-10-23 | End: 2023-10-22

## 2023-10-22 RX ORDER — FLUTICASONE PROPIONATE 50 MCG
1 SPRAY, SUSPENSION (ML) NASAL ONCE
Status: COMPLETED | OUTPATIENT
Start: 2023-10-22 | End: 2023-10-22

## 2023-10-22 RX ADMIN — FLUTICASONE PROPIONATE 1 SPRAY: 50 SPRAY, METERED NASAL at 21:44

## 2023-10-22 NOTE — Clinical Note
Jayme Irene was seen and treated in our emergency department on 10/22/2023. No restrictions            Diagnosis: viral illness    Avtar Eugene  is off the rest of the shift today, may return to work on return date. She may return on this date: 10/25/2023         If you have any questions or concerns, please don't hesitate to call.       Diane Kelly MD    ______________________________           _______________          _______________  [facility] Representative                              Date                                Time

## 2023-10-23 NOTE — ED ATTENDING ATTESTATION
10/22/2023  I, Lore Marin MD, saw and evaluated the patient. I have discussed the patient with the resident/non-physician practitioner and agree with the resident's/non-physician practitioner's findings, Plan of Care, and MDM as documented in the resident's/non-physician practitioner's note, except where noted. All available labs and Radiology studies were reviewed. I was present for key portions of any procedure(s) performed by the resident/non-physician practitioner and I was immediately available to provide assistance. At this point I agree with the current assessment done in the Emergency Department.   I have conducted an independent evaluation of this patient a history and physical is as follows:  Pt ahs 3 day history of congestion sore throat subjective fever Family ill with same + cough clear in color + chest tightness no sob no uti no nvd Pt has not been able to work secondary to illness PE:alert heart reg lungs clear pharynx mild erythema no exudates MDM: will treat symptomatically   ED Course         Critical Care Time  Procedures

## 2023-10-23 NOTE — DISCHARGE INSTRUCTIONS
You were seen in the Emergency Department today for cough, congestion. Please follow up with your primary care doctor in 1 week for re-evaluation. Please return to the Emergency Department if you experience worsening of your current symptoms or any other concerning symptoms including uncontrolled fever, changes in sputum color, shortness of breath not improved with inhaler.

## 2023-10-23 NOTE — ED PROVIDER NOTES
History  Chief Complaint   Patient presents with    Nasal Congestion     Pt stated she has asthma and has been having nasal congestion x3days. Home remedies haven't been working. Pt just needs doctors note for work      HPI    Patient is a 19 y.o. female with PMHx asthma who presents to the ED via private vehicle for evaluation of subjective fever, cough, congestion, sore throat x3 days. Cough productive of clear sputum. Denies chest pain, SOB, abdominal pain, N/V/D, headache, dizziness. Patient states she has been taking OTC medications with minimal improvement of symptoms. Patient states she had to call off work for the last couple of days and was told to come in for doctors note. Prior to Admission Medications   Prescriptions Last Dose Informant Patient Reported?  Taking?   acetaminophen (TYLENOL) 325 mg tablet   No No   Sig: Take 2 tablets (650 mg total) by mouth every 4 (four) hours as needed for mild pain, headaches or fever   albuterol (PROVENTIL HFA,VENTOLIN HFA) 90 mcg/act inhaler  Self No No   Sig: TAKE 2 PUFFS BY MOUTH EVERY 6 HOURS AS NEEDED FOR WHEEZE OR FOR SHORTNESS OF BREATH   famotidine (PEPCID) 20 mg tablet  Self No No   Sig: Take 1 tablet (20 mg total) by mouth 2 (two) times a day   ibuprofen (MOTRIN) 600 mg tablet   No No   Sig: Take 1 tablet (600 mg total) by mouth every 6 (six) hours   ondansetron (ZOFRAN) 4 mg tablet  Self No No   Sig: Take 1 tablet (4 mg total) by mouth every 8 (eight) hours as needed for nausea or vomiting   prenatal vitamin (CLASSIC FORMULA) 27-0.8 mg  Self No No   Sig: Take 1 tablet by mouth in the morning      Facility-Administered Medications: None       Past Medical History:   Diagnosis Date    Asthma     No known problems     Varicella     immunized       Past Surgical History:   Procedure Laterality Date    NO PAST SURGERIES         Family History   Problem Relation Age of Onset    Other Mother         back pain    Other Father         back pain     Asthma Father Asthma Brother     Allergies Brother     Asthma Brother     Other Paternal Grandmother         back pain     Other Paternal Grandfather     Asthma Paternal Grandfather     Other Cousin         back pain     Asthma Cousin     Tuberculosis Cousin     Other Family         back pain     Asthma Family     Cancer Family     Tuberculosis Family     Other Family         eye abnormalities     I have reviewed and agree with the history as documented. E-Cigarette/Vaping    E-Cigarette Use Never User      E-Cigarette/Vaping Substances    Nicotine No     THC No     CBD No     Flavoring No     Other No     Unknown No      Social History     Tobacco Use    Smoking status: Never     Passive exposure: Yes    Smokeless tobacco: Never    Tobacco comments:     Exposure to secondhand smoke    Vaping Use    Vaping Use: Never used   Substance Use Topics    Alcohol use: Never    Drug use: Yes     Types: Marijuana     Comment: pt reports light use throughout pregnancy        Review of Systems   Constitutional:  Positive for fever. Negative for chills. HENT:  Positive for congestion and sore throat. Negative for ear pain. Eyes:  Negative for pain and visual disturbance. Respiratory:  Positive for cough. Negative for shortness of breath. Cardiovascular:  Negative for chest pain and palpitations. Gastrointestinal:  Negative for abdominal pain, diarrhea, nausea and vomiting. Genitourinary:  Negative for dysuria and hematuria. Musculoskeletal:  Negative for arthralgias and back pain. Skin:  Negative for color change and rash. Neurological:  Negative for dizziness, seizures, syncope, light-headedness and headaches. All other systems reviewed and are negative.       Physical Exam  ED Triage Vitals [10/22/23 2015]   Temperature Pulse Respirations Blood Pressure SpO2   (!) 97.1 °F (36.2 °C) 103 18 127/92 98 %      Temp Source Heart Rate Source Patient Position - Orthostatic VS BP Location FiO2 (%)   Temporal Monitor Lying Right arm --      Pain Score       --             Orthostatic Vital Signs  Vitals:    10/22/23 2015   BP: 127/92   Pulse: 103   Patient Position - Orthostatic VS: Lying       Physical Exam  Vitals and nursing note reviewed. Constitutional:       General: She is not in acute distress. Appearance: Normal appearance. She is well-developed. She is not ill-appearing, toxic-appearing or diaphoretic. HENT:      Head: Normocephalic and atraumatic. Nose: Congestion present. Mouth/Throat:      Mouth: Mucous membranes are moist.      Pharynx: Posterior oropharyngeal erythema present. No oropharyngeal exudate. Comments: Oropharynx mild erythema and no exudate  Eyes:      Conjunctiva/sclera: Conjunctivae normal.   Cardiovascular:      Rate and Rhythm: Normal rate and regular rhythm. Pulses: Normal pulses. Heart sounds: Normal heart sounds. No murmur heard. Pulmonary:      Effort: Pulmonary effort is normal. No respiratory distress. Breath sounds: Normal breath sounds. No stridor. No wheezing, rhonchi or rales. Chest:      Chest wall: No tenderness. Abdominal:      Palpations: Abdomen is soft. Tenderness: There is no abdominal tenderness. Musculoskeletal:         General: No swelling. Cervical back: Neck supple. Skin:     General: Skin is warm and dry. Capillary Refill: Capillary refill takes less than 2 seconds. Neurological:      General: No focal deficit present. Mental Status: She is alert and oriented to person, place, and time.    Psychiatric:         Mood and Affect: Mood normal.         ED Medications  Medications   fluticasone (FLONASE) 50 mcg/act nasal spray 1 spray (1 spray Each Nare Given 10/22/23 2144)       Diagnostic Studies  Results Reviewed       None                   No orders to display         Procedures  Procedures      ED Course         CRAFFT      Flowsheet Row Most Recent Value   MAKSIM Initial Screen: During the past 12 months, did you: 1. Drink any alcohol (more than a few sips)? No Filed at: 10/22/2023 2016   2. Smoke any marijuana or hashish No Filed at: 10/22/2023 2016   3. Use anything else to get high? ("anything else" includes illegal drugs, over the counter and prescription drugs, and things that you sniff or 'rondon')? No Filed at: 10/22/2023 2016                                      Medical Decision Making    ASSESSMENT: Patient is a 19 y.o. female who presents with subjective fever, cough, congestion, sore throat x 3 days. DDX includes but not limited to: viral illness. PLAN: supportive care. Treated with flonase. Offered viral testing, patient declined. Stable for discharge. Strict return to ED precautions provided. Advised to follow up with PCP within 1 week for re-evaluation and further management. Patient verbalized understanding and agrees with the plan of care. Work note provided. Disposition  Final diagnoses:   Nasal congestion   Viral illness   Cough   History of asthma     Time reflects when diagnosis was documented in both MDM as applicable and the Disposition within this note       Time User Action Codes Description Comment    10/22/2023  9:28 PM David Romp T Add [R09.81] Nasal congestion     10/22/2023  9:28 PM David Romp T Add [B34.9] Viral illness     10/22/2023  9:28 PM David Romp T Add [R05.9] Cough     10/22/2023  9:29 PM David Romp T Add [Z87.09] History of asthma           ED Disposition       ED Disposition   Discharge    Condition   Stable    Date/Time   Sun Oct 22, 2023 2128    Comment   Cameron Duarte discharge to home/self care.                    Follow-up Information       Follow up With Specialties Details Why Contact Info Additional [address] [facility] Emergency Medicine  If symptoms worsen [address] [phone]  [facility], [address], [place], [address] [place] [phone]     Susanne Walsh    [phone]               Discharge Medication List as of 10/22/2023  9:30 PM        CONTINUE these medications which have NOT CHANGED    Details   acetaminophen (TYLENOL) 325 mg tablet Take 2 tablets (650 mg total) by mouth every 4 (four) hours as needed for mild pain, headaches or fever, Starting Mon 7/3/2023, No Print      albuterol (PROVENTIL HFA,VENTOLIN HFA) 90 mcg/act inhaler TAKE 2 PUFFS BY MOUTH EVERY 6 HOURS AS NEEDED FOR WHEEZE OR FOR SHORTNESS OF BREATH, Normal      famotidine (PEPCID) 20 mg tablet Take 1 tablet (20 mg total) by mouth 2 (two) times a day, Starting Mon 2/20/2023, Normal      ibuprofen (MOTRIN) 600 mg tablet Take 1 tablet (600 mg total) by mouth every 6 (six) hours, Starting Mon 7/3/2023, Normal      ondansetron (ZOFRAN) 4 mg tablet Take 1 tablet (4 mg total) by mouth every 8 (eight) hours as needed for nausea or vomiting, Starting Mon 5/22/2023, Normal      prenatal vitamin (CLASSIC FORMULA) 27-0.8 mg Take 1 tablet by mouth in the morning, Starting Tue 1/10/2023, Normal           No discharge procedures on file. PDMP Review       None             ED Provider  Attending physically available and evaluated Dina Sweeney. I managed the patient along with the ED Attending.     Electronically Signed by           Vincent Troncoso MD  10/23/23 0804

## 2023-11-02 DIAGNOSIS — Z30.019 ENCOUNTER FOR FEMALE BIRTH CONTROL: Primary | ICD-10-CM

## 2023-11-02 RX ORDER — MEDROXYPROGESTERONE ACETATE 150 MG/ML
150 INJECTION, SUSPENSION INTRAMUSCULAR
Qty: 1 ML | Refills: 3 | Status: SHIPPED | OUTPATIENT
Start: 2023-11-02

## 2023-11-09 ENCOUNTER — OFFICE VISIT (OUTPATIENT)
Dept: OBGYN CLINIC | Facility: CLINIC | Age: 18
End: 2023-11-09

## 2023-11-09 VITALS
SYSTOLIC BLOOD PRESSURE: 90 MMHG | HEIGHT: 63 IN | BODY MASS INDEX: 30.69 KG/M2 | HEART RATE: 100 BPM | WEIGHT: 173.2 LBS | DIASTOLIC BLOOD PRESSURE: 64 MMHG

## 2023-11-09 DIAGNOSIS — Z30.42 ENCOUNTER FOR MANAGEMENT AND INJECTION OF DEPO-PROVERA: Primary | ICD-10-CM

## 2023-11-09 LAB — SL AMB POCT URINE HCG: NEGATIVE

## 2023-11-09 PROCEDURE — 96372 THER/PROPH/DIAG INJ SC/IM: CPT | Performed by: NURSE PRACTITIONER

## 2023-11-09 PROCEDURE — 81025 URINE PREGNANCY TEST: CPT | Performed by: NURSE PRACTITIONER

## 2023-11-09 PROCEDURE — 99213 OFFICE O/P EST LOW 20 MIN: CPT | Performed by: NURSE PRACTITIONER

## 2023-11-09 RX ORDER — MEDROXYPROGESTERONE ACETATE 150 MG/ML
150 INJECTION, SUSPENSION INTRAMUSCULAR ONCE
Status: COMPLETED | OUTPATIENT
Start: 2023-11-09 | End: 2023-11-09

## 2023-11-09 RX ADMIN — MEDROXYPROGESTERONE ACETATE 150 MG: 150 INJECTION, SUSPENSION INTRAMUSCULAR at 11:29

## 2023-11-09 NOTE — PROGRESS NOTES
PROBLEM GYNECOLOGICAL VISIT    Elizabeth Strickland is a 19 y.o. female who presents today to restart depo-provera. Her general medical history has been reviewed and she reports it as follows:    Past Medical History:   Diagnosis Date    Asthma     No known problems     Varicella     immunized     Past Surgical History:   Procedure Laterality Date    NO PAST SURGERIES       OB History          1    Para   1    Term   1            AB        Living   1         SAB        IAB        Ectopic        Multiple   0    Live Births   1               Social History     Tobacco Use    Smoking status: Every Day     Packs/day: 0.50     Types: Cigarettes     Start date:      Passive exposure: Yes    Smokeless tobacco: Never    Tobacco comments:     Exposure to secondhand smoke    Vaping Use    Vaping Use: Never used   Substance Use Topics    Alcohol use: Yes     Comment: socially    Drug use: Yes     Types: Marijuana     Comment: pt reports light use throughout pregnancy     Social History     Substance and Sexual Activity   Sexual Activity Yes    Partners: Male    Birth control/protection: Condom Male, Injection       Current Outpatient Medications   Medication Instructions    acetaminophen (TYLENOL) 650 mg, Oral, Every 4 hours PRN    albuterol (PROVENTIL HFA,VENTOLIN HFA) 90 mcg/act inhaler TAKE 2 PUFFS BY MOUTH EVERY 6 HOURS AS NEEDED FOR WHEEZE OR FOR SHORTNESS OF BREATH    famotidine (PEPCID) 20 mg, Oral, 2 times daily    ibuprofen (MOTRIN) 600 mg, Oral, Every 6 hours    medroxyPROGESTERone (DEPO-PROVERA) 150 mg, Intramuscular, Every 3 months    ondansetron (ZOFRAN) 4 mg, Oral, Every 8 hours PRN    prenatal vitamin (CLASSIC FORMULA) 27-0.8 mg 1 tablet, Oral, Daily       History of Present Illness:   Prosper Oliveros presents today to restart depo-provera. She had her last dose 23 after the birth of her baby.  She was due for her next dose by . She does report unprotected intercourse since the expiration of the last depo dose. Review of Systems:  Review of Systems   Constitutional: Negative. Gastrointestinal: Negative. Genitourinary: Negative. Physical Exam:  BP 90/64 (BP Location: Right arm)   Pulse 100   Ht 5' 3" (1.6 m)   Wt 78.6 kg (173 lb 3.2 oz)   LMP  (LMP Unknown)   BMI 30.68 kg/m²   Physical Exam  Constitutional:       General: She is not in acute distress. Appearance: Normal appearance. Neurological:      Mental Status: She is alert. Skin:     General: Skin is warm and dry. Psychiatric:         Mood and Affect: Mood normal.         Behavior: Behavior normal.   Vitals reviewed. Point of Care Testing:   -urine pregnancy test: negative       Assessment:   1. Encounter for management and injection of depo-Provera     Plan:   1. Discussed that though unlikely, there is a possibility of early pregnancy due to unprotected intercourse. Discussed that depo-provera is safe to receive due to the benefit of pregnancy prevention. 2. Depo given   3. Return in 3 months for next dose. Reviewed with patient that [facility], but that they will not necessarily be reviewed by me immediately. Explained that I will review results at my earliest opportunity and contact patient appropriately.

## 2023-12-11 ENCOUNTER — HOSPITAL ENCOUNTER (EMERGENCY)
Facility: HOSPITAL | Age: 18
Discharge: HOME | End: 2023-12-11
Attending: EMERGENCY MEDICINE | Admitting: EMERGENCY MEDICINE
Payer: MEDICARE

## 2023-12-11 VITALS
RESPIRATION RATE: 18 BRPM | SYSTOLIC BLOOD PRESSURE: 127 MMHG | OXYGEN SATURATION: 98 % | DIASTOLIC BLOOD PRESSURE: 90 MMHG | HEART RATE: 100 BPM | TEMPERATURE: 100.6 F

## 2023-12-11 DIAGNOSIS — J45.909 ASTHMA: ICD-10-CM

## 2023-12-11 DIAGNOSIS — J06.9 VIRAL URI WITH COUGH: Primary | ICD-10-CM

## 2023-12-11 DIAGNOSIS — J32.9 SINUSITIS: ICD-10-CM

## 2023-12-11 LAB
FLUAV RNA RESP QL NAA+PROBE: POSITIVE
FLUBV RNA RESP QL NAA+PROBE: NEGATIVE
RSV RNA RESP QL NAA+PROBE: NEGATIVE
SARS-COV-2 RNA RESP QL NAA+PROBE: NEGATIVE

## 2023-12-11 PROCEDURE — 0241U HB NFCT DS VIR RESP RNA 4 TRGT: CPT

## 2023-12-11 PROCEDURE — 99284 EMERGENCY DEPT VISIT MOD MDM: CPT | Performed by: EMERGENCY MEDICINE

## 2023-12-11 PROCEDURE — 99283 EMERGENCY DEPT VISIT LOW MDM: CPT

## 2023-12-11 RX ORDER — OXYMETAZOLINE HYDROCHLORIDE 0.05 G/100ML
2 SPRAY NASAL ONCE
Status: COMPLETED | OUTPATIENT
Start: 2023-12-11 | End: 2023-12-11

## 2023-12-11 RX ORDER — ALBUTEROL SULFATE 90 UG/1
2 AEROSOL, METERED RESPIRATORY (INHALATION) ONCE
Status: COMPLETED | OUTPATIENT
Start: 2023-12-11 | End: 2023-12-11

## 2023-12-11 RX ORDER — IBUPROFEN 400 MG/1
400 TABLET ORAL ONCE
Status: COMPLETED | OUTPATIENT
Start: 2023-12-11 | End: 2023-12-11

## 2023-12-11 RX ORDER — ACETAMINOPHEN 325 MG/1
975 TABLET ORAL ONCE
Status: COMPLETED | OUTPATIENT
Start: 2023-12-11 | End: 2023-12-11

## 2023-12-11 RX ADMIN — ACETAMINOPHEN 975 MG: 325 TABLET, FILM COATED ORAL at 15:54

## 2023-12-11 RX ADMIN — ALBUTEROL SULFATE 2 PUFF: 90 AEROSOL, METERED RESPIRATORY (INHALATION) at 15:54

## 2023-12-11 RX ADMIN — IBUPROFEN 400 MG: 400 TABLET, FILM COATED ORAL at 15:54

## 2023-12-11 RX ADMIN — OXYMETAZOLINE HYDROCHLORIDE 2 SPRAY: 0.05 SPRAY NASAL at 15:54

## 2023-12-11 NOTE — ED PROVIDER NOTES
History  Chief Complaint   Patient presents with    Cough     C/o sore throat, cough, headache, congestion x3 days. Hx of asthma, does not have pump     HPI  Patient is a 19 y.o. female with history of asthma presenting to the emergency department for cough, chills, difficulty breathing, and sinus congestion. Patient states that she developed her symptoms a few days ago but they became worse today. She has an albuterol inhaler that she takes as needed for her asthma but she ran out of it. She denies having any chest pain or other complaints at this time. Prior to Admission Medications   Prescriptions Last Dose Informant Patient Reported?  Taking?   acetaminophen (TYLENOL) 325 mg tablet   No No   Sig: Take 2 tablets (650 mg total) by mouth every 4 (four) hours as needed for mild pain, headaches or fever   Patient not taking: Reported on 11/9/2023   albuterol (PROVENTIL HFA,VENTOLIN HFA) 90 mcg/act inhaler  Self No No   Sig: TAKE 2 PUFFS BY MOUTH EVERY 6 HOURS AS NEEDED FOR WHEEZE OR FOR SHORTNESS OF BREATH   famotidine (PEPCID) 20 mg tablet  Self No No   Sig: Take 1 tablet (20 mg total) by mouth 2 (two) times a day   Patient not taking: Reported on 11/9/2023   ibuprofen (MOTRIN) 600 mg tablet   No No   Sig: Take 1 tablet (600 mg total) by mouth every 6 (six) hours   Patient not taking: Reported on 11/9/2023   medroxyPROGESTERone (DEPO-PROVERA) 150 mg/mL injection   No No   Sig: Inject 1 mL (150 mg total) into a muscle every 3 (three) months   ondansetron (ZOFRAN) 4 mg tablet  Self No No   Sig: Take 1 tablet (4 mg total) by mouth every 8 (eight) hours as needed for nausea or vomiting   Patient not taking: Reported on 11/9/2023   prenatal vitamin (CLASSIC FORMULA) 27-0.8 mg  Self No No   Sig: Take 1 tablet by mouth in the morning   Patient not taking: Reported on 11/9/2023      Facility-Administered Medications: None       Past Medical History:   Diagnosis Date    Asthma     No known problems     Varicella immunized       Past Surgical History:   Procedure Laterality Date    NO PAST SURGERIES         Family History   Problem Relation Age of Onset    Other Mother         back pain    Other Father         back pain     Asthma Father     Asthma Brother     Allergies Brother     Asthma Brother     Other Paternal Grandmother         back pain     Other Paternal Grandfather     Asthma Paternal Grandfather     Other Cousin         back pain     Asthma Cousin     Tuberculosis Cousin     Other Family         back pain     Asthma Family     Cancer Family     Tuberculosis Family     Other Family         eye abnormalities     I have reviewed and agree with the history as documented. E-Cigarette/Vaping    E-Cigarette Use Never User      E-Cigarette/Vaping Substances    Nicotine No     THC No     CBD No     Flavoring No     Other No     Unknown No      Social History     Tobacco Use    Smoking status: Every Day     Packs/day: 0.50     Types: Cigarettes     Start date: 2022     Passive exposure: Yes    Smokeless tobacco: Never    Tobacco comments:     Exposure to secondhand smoke    Vaping Use    Vaping Use: Never used   Substance Use Topics    Alcohol use: Yes     Comment: socially    Drug use: Yes     Types: Marijuana     Comment: pt reports light use throughout pregnancy        Review of Systems   Constitutional:  Positive for chills and fever. HENT:  Positive for congestion. Eyes:  Negative for redness. Respiratory:  Positive for cough and shortness of breath. Cardiovascular:  Negative for chest pain and palpitations. Gastrointestinal:  Negative for abdominal pain, diarrhea and vomiting. Endocrine: Negative for polyuria. Genitourinary:  Negative for dysuria and hematuria. Musculoskeletal:  Negative for back pain. Skin:  Negative for rash. Neurological:  Positive for headaches. Negative for light-headedness. All other systems reviewed and are negative.       Physical Exam  ED Triage Vitals [12/11/23 1520]   Temperature Pulse Respirations Blood Pressure SpO2   99.2 °F (37.3 °C) 100 18 127/90 98 %      Temp Source Heart Rate Source Patient Position - Orthostatic VS BP Location FiO2 (%)   Temporal Monitor -- Right arm --      Pain Score       9             Orthostatic Vital Signs  Vitals:    12/11/23 1520   BP: 127/90   Pulse: 100       Physical Exam  Vitals and nursing note reviewed. Constitutional:       Appearance: Normal appearance. HENT:      Head: Normocephalic and atraumatic. Nose: Congestion present. Mouth/Throat:      Mouth: Mucous membranes are moist.   Eyes:      Conjunctiva/sclera: Conjunctivae normal.   Cardiovascular:      Rate and Rhythm: Normal rate and regular rhythm. Pulses: Normal pulses. Heart sounds: Normal heart sounds. Pulmonary:      Effort: Pulmonary effort is normal. No respiratory distress. Breath sounds: Normal breath sounds. No stridor. No wheezing, rhonchi or rales. Chest:      Chest wall: No tenderness. Abdominal:      Palpations: Abdomen is soft. Tenderness: There is no abdominal tenderness. Musculoskeletal:         General: No tenderness. Cervical back: Neck supple. Skin:     General: Skin is warm and dry. Capillary Refill: Capillary refill takes less than 2 seconds. Neurological:      General: No focal deficit present. Mental Status: She is alert. Mental status is at baseline.    Psychiatric:         Mood and Affect: Mood normal.         ED Medications  Medications   oxymetazoline (AFRIN) 0.05 % nasal spray 2 spray (2 sprays Each Nare Given 12/11/23 1554)   albuterol (PROVENTIL HFA,VENTOLIN HFA) inhaler 2 puff (2 puffs Inhalation Given 12/11/23 1554)   acetaminophen (TYLENOL) tablet 975 mg (975 mg Oral Given 12/11/23 1554)   ibuprofen (MOTRIN) tablet 400 mg (400 mg Oral Given 12/11/23 1554)       Diagnostic Studies  Results Reviewed       Procedure Component Value Units Date/Time    FLU/RSV/COVID - if FLU/RSV clinically relevant [[id number]] Collected: 12/11/23 2568    Lab Status: In process Specimen: Nares from Nose Updated: 12/11/23 1607                   No orders to display         Procedures  Procedures      ED Course                                       Medical Decision Making  Risk  OTC drugs. Prescription drug management. Patient is a 19 y.o. female with PMH of asthma who presents to the ED with cough, chills, sinus congestion. Vital signs febrile. On exam sinus congestion, lungs clear. History and physical exam most consistent with viral URI. However, differential diagnosis included but not limited to asthma exacerbation, sinusitis. Plan: afrin, albuterol, tylenol, motrin, COVID/Flu/RSV testing    View ED course above for further discussion on patient workup. All labs reviewed and utilized in the medical decision making process  All radiology studies independently viewed by me and interpreted by the radiologist.  I reviewed all testing with the patient. Upon re-evaluation patient feeling better. Disposition: I have reviewed the patient's vital signs, nursing notes, and other relevant tests/information. I had a detailed discussion with the patient regarding the history, exam findings, and any diagnostic results. Plan to discharge home in stable condition, follow up with primary care provider. Discussed with patient who is agreeable to plan. I discussed discharge instructions, need for follow-up, and oral return precautions for what to return for in addition to the written return precautions and discharge instructions, specifically highlighting areas of special concern. The patient verbalized understanding of the discharge instructions and warnings that would necessitate return to the Emergency Department including difficulty breathing. All questions the patient had were answered prior to discharge to the best of my ability.        Portions of the record may have been created with voice recognition software. Occasional wrong word or "sound a like" substitutions may have occurred due to the inherent limitations of voice recognition software. Read the chart carefully and recognize, using context, where substitutions have occurred. Disposition  Final diagnoses:   Viral URI with cough   Asthma   Sinusitis     Time reflects when diagnosis was documented in both MDM as applicable and the Disposition within this note       Time User Action Codes Description Comment    12/11/2023  3:56 PM Cody De Luna Add [J06.9] Viral URI with cough     12/11/2023  3:56 PM Cody De Luna Add [A32.633] Asthma     12/11/2023  4:06 PM Cody De Luna Add [J32.9] Sinusitis           ED Disposition       ED Disposition   Discharge    Condition   Stable    Date/Time   Mon Dec 11, 2023  3:56 PM    Comment   Nikolas discharge to home/self care. Follow-up Information       Follow up With Specialties Details Why Contact Info Additional [address] [facility] Schedule an appointment as soon as possible for a visit in 1 week  [address] [place] [phone]  [address] [place] [address], [phone]            Patient's Medications   Discharge Prescriptions    No medications on file     No discharge procedures on file. PDMP Review       None             ED Provider  Attending physically available and evaluated Nikolas. I managed the patient along with the ED Attending.     Electronically Signed by           Cody De Luna DO  12/11/23 8056

## 2023-12-11 NOTE — Clinical Note
Coby Miller was seen and treated in our emergency department on 12/11/2023. Diagnosis:     Jazel  . She may return on this date: 12/13/2023         If you have any questions or concerns, please don't hesitate to call.       Calderon Hill, DO    ______________________________           _______________          _______________  Hospital Representative                              Date                                Time

## 2023-12-11 NOTE — ED ATTENDING ATTESTATION
12/11/2023  IFarhana MD, saw and evaluated the patient. I have discussed the patient with the resident/non-physician practitioner and agree with the resident's/non-physician practitioner's findings, Plan of Care, and MDM as documented in the resident's/non-physician practitioner's note, except where noted. All available labs and Radiology studies were reviewed. I was present for key portions of any procedure(s) performed by the resident/non-physician practitioner and I was immediately available to provide assistance. At this point I agree with the current assessment done in the Emergency Department. I have conducted an independent evaluation of this patient a history and physical is as follows:    ED Course     17-year-old female, history of asthma, presenting to the emergency department for evaluation of several days of cough, chills, difficulty breathing, sinus congestion. Patient has run out of her albuterol inhaler. No chest pain, abdominal pain, nausea, vomiting, diarrhea. The patient is resting comfortably on a stretcher in no acute respiratory distress. The patient appears nontoxic. HEENT reveals moist mucous membranes. Head is normocephalic and atraumatic. Conjunctiva and sclera are normal. Neck is nontender and supple with full range of motion to flexion, extension, lateral rotation. No meningismus appreciated. No masses are appreciated. Lungs are clear to auscultation bilaterally without any wheezes, rales or rhonchi. Heart is regular rate and rhythm without any murmurs, rubs or gallops. Abdomen is soft and nontender without any rebound or guarding. Extremities appear grossly normal without any significant arthropathy. Patient is awake, alert, and oriented x3. The patient has normal interaction. Cranial nerves grossly intact. Motor is 5 out of 5 bilateral upper and lower extremities. Viral illness. Patient will be treated symptomatically.         Critical Care Time  Procedures

## 2023-12-11 NOTE — DISCHARGE INSTRUCTIONS
You were seen in the emergency department today for cough, fever. Your testing for COVID/Flu/RSV is pending. Follow up with your primary care provider. Take Afrin twice a day for max of three days (do not take after Wednesday). Take Tylenol / Ibuprofen as needed following the instructions on the bottle. Use your albuterol inhaler as needed for difficulty breathing. Return to the emergency department for any new or concerning symptoms including difficulty breathing. Thank you for choosing [facility] for your care today.

## 2024-02-15 ENCOUNTER — OFFICE VISIT (OUTPATIENT)
Dept: OBGYN CLINIC | Facility: CLINIC | Age: 19
End: 2024-02-15

## 2024-02-15 VITALS
HEART RATE: 105 BPM | SYSTOLIC BLOOD PRESSURE: 111 MMHG | HEIGHT: 63 IN | WEIGHT: 181.8 LBS | DIASTOLIC BLOOD PRESSURE: 68 MMHG | BODY MASS INDEX: 32.21 KG/M2

## 2024-02-15 DIAGNOSIS — Z30.42 ENCOUNTER FOR MANAGEMENT AND INJECTION OF DEPO-PROVERA: Primary | ICD-10-CM

## 2024-02-15 PROCEDURE — 99213 OFFICE O/P EST LOW 20 MIN: CPT | Performed by: NURSE PRACTITIONER

## 2024-02-15 PROCEDURE — 96372 THER/PROPH/DIAG INJ SC/IM: CPT | Performed by: NURSE PRACTITIONER

## 2024-02-15 RX ORDER — MEDROXYPROGESTERONE ACETATE 150 MG/ML
150 INJECTION, SUSPENSION INTRAMUSCULAR ONCE
Status: COMPLETED | OUTPATIENT
Start: 2024-02-15 | End: 2024-02-15

## 2024-02-15 RX ORDER — MEDROXYPROGESTERONE ACETATE 150 MG/ML
150 INJECTION, SUSPENSION INTRAMUSCULAR
Qty: 1 ML | Refills: 0 | Status: SHIPPED | OUTPATIENT
Start: 2024-02-15

## 2024-02-15 RX ADMIN — MEDROXYPROGESTERONE ACETATE 150 MG: 150 INJECTION, SUSPENSION INTRAMUSCULAR at 10:42

## 2024-02-15 NOTE — PROGRESS NOTES
PROBLEM GYNECOLOGICAL VISIT    Max Tran is a 18 y.o. female who presents today to restart depo-provera.  Her general medical history has been reviewed and she reports it as follows:    Past Medical History:   Diagnosis Date    Asthma     No known problems     Varicella     immunized     Past Surgical History:   Procedure Laterality Date    NO PAST SURGERIES       OB History          1    Para   1    Term   1            AB        Living   1         SAB        IAB        Ectopic        Multiple   0    Live Births   1               Social History     Tobacco Use    Smoking status: Every Day     Current packs/day: 0.50     Average packs/day: 0.5 packs/day for 2.1 years (1.1 ttl pk-yrs)     Types: Cigarettes     Start date:      Passive exposure: Yes    Smokeless tobacco: Never    Tobacco comments:     Exposure to secondhand smoke    Vaping Use    Vaping status: Never Used   Substance Use Topics    Alcohol use: Yes     Comment: socially    Drug use: Yes     Types: Marijuana     Comment: pt reports light use throughout pregnancy     Social History     Substance and Sexual Activity   Sexual Activity Yes    Partners: Male    Birth control/protection: Condom Male, Injection       Current Outpatient Medications   Medication Instructions    acetaminophen (TYLENOL) 650 mg, Oral, Every 4 hours PRN    albuterol (PROVENTIL HFA,VENTOLIN HFA) 90 mcg/act inhaler TAKE 2 PUFFS BY MOUTH EVERY 6 HOURS AS NEEDED FOR WHEEZE OR FOR SHORTNESS OF BREATH    famotidine (PEPCID) 20 mg, Oral, 2 times daily    ibuprofen (MOTRIN) 600 mg, Oral, Every 6 hours    medroxyPROGESTERone (DEPO-PROVERA) 150 mg, Intramuscular, Every 3 months    ondansetron (ZOFRAN) 4 mg, Oral, Every 8 hours PRN    prenatal vitamin (CLASSIC FORMULA) 27-0.8 mg 1 tablet, Oral, Daily       History of Present Illness:   Max presents today to restart depo-provera. She had her last dose 23. She was due for her next dose by . She does  "report unprotected intercourse since the expiration of the last depo dose.      Review of Systems:  Review of Systems   Constitutional: Negative.    Gastrointestinal: Negative.    Genitourinary: Negative.        Physical Exam:  /68 (BP Location: Right arm, Patient Position: Sitting, Cuff Size: Large)   Pulse 105   Ht 5' 3\" (1.6 m)   Wt 82.5 kg (181 lb 12.8 oz)   LMP  (LMP Unknown)   Breastfeeding No   BMI 32.20 kg/m²   Physical Exam  Constitutional:       General: She is not in acute distress.     Appearance: Normal appearance.   Neurological:      Mental Status: She is alert.   Skin:     General: Skin is dry.   Psychiatric:         Mood and Affect: Mood normal.         Behavior: Behavior normal.   Vitals reviewed.         Point of Care Testing:   -urine pregnancy test: negative        Assessment:   1. Encounter for management and injection of depo-Provera     Plan:   1. Discussed that though unlikely, there is a possibility of early pregnancy due to unprotected intercourse. Discussed that depo-provera is safe to receive even if there is an early pregnancy and will not be damaging to pregnancy. She would like to proceed with depo-provera.               2. Depo given              3. Return in 3 months for next dose    Reviewed with patient that test results are available in [facility] immediately, but that they will not necessarily be reviewed by me immediately.  Explained that I will review results at my earliest opportunity and contact patient appropriately.  "

## 2024-02-15 NOTE — PROGRESS NOTES
Depo-Provera                                 [x]   Patient provided box yes   0 Refills remain   Refills submitted yes   Last  Annual Date / Birth control check : 02/15/2024   Last Depo date: 1st depo 02/15/24   Side effects: no   HCG: yes, negative   Given by: Patti Sloan   Site: Right Ventrogluteal      Calcium supplement daily teaching   Condoms for 2 weeks following first injection dose.

## 2024-02-22 ENCOUNTER — TELEPHONE (OUTPATIENT)
Dept: INTERNAL MEDICINE CLINIC | Facility: CLINIC | Age: 19
End: 2024-02-22

## 2024-03-07 ENCOUNTER — OFFICE VISIT (OUTPATIENT)
Dept: INTERNAL MEDICINE CLINIC | Facility: CLINIC | Age: 19
End: 2024-03-07

## 2024-03-07 ENCOUNTER — TELEPHONE (OUTPATIENT)
Age: 19
End: 2024-03-07

## 2024-03-07 VITALS
HEART RATE: 80 BPM | SYSTOLIC BLOOD PRESSURE: 92 MMHG | DIASTOLIC BLOOD PRESSURE: 64 MMHG | HEIGHT: 63 IN | TEMPERATURE: 98.7 F | WEIGHT: 182 LBS | BODY MASS INDEX: 32.25 KG/M2

## 2024-03-07 DIAGNOSIS — G89.29 NECK PAIN, CHRONIC: ICD-10-CM

## 2024-03-07 DIAGNOSIS — M54.9 CHRONIC UPPER BACK PAIN: ICD-10-CM

## 2024-03-07 DIAGNOSIS — R53.83 FATIGUE, UNSPECIFIED TYPE: ICD-10-CM

## 2024-03-07 DIAGNOSIS — N62 MACROMASTIA: ICD-10-CM

## 2024-03-07 DIAGNOSIS — F41.9 ANXIETY AND DEPRESSION: ICD-10-CM

## 2024-03-07 DIAGNOSIS — M54.2 NECK PAIN, CHRONIC: ICD-10-CM

## 2024-03-07 DIAGNOSIS — K06.8 BLEEDING GUMS: ICD-10-CM

## 2024-03-07 DIAGNOSIS — F32.A ANXIETY AND DEPRESSION: ICD-10-CM

## 2024-03-07 DIAGNOSIS — Z00.00 WELL ADULT EXAM: Primary | ICD-10-CM

## 2024-03-07 DIAGNOSIS — G89.29 CHRONIC UPPER BACK PAIN: ICD-10-CM

## 2024-03-07 DIAGNOSIS — H65.23 BILATERAL CHRONIC SEROUS OTITIS MEDIA: ICD-10-CM

## 2024-03-07 PROBLEM — L02.215 PERINEAL ABSCESS: Status: RESOLVED | Noted: 2022-12-30 | Resolved: 2024-03-07

## 2024-03-07 PROBLEM — O26.893 ABDOMINAL PAIN IN PREGNANCY, THIRD TRIMESTER: Status: RESOLVED | Noted: 2023-06-14 | Resolved: 2024-03-07

## 2024-03-07 PROBLEM — R10.9 ABDOMINAL PAIN IN PREGNANCY, THIRD TRIMESTER: Status: RESOLVED | Noted: 2023-06-14 | Resolved: 2024-03-07

## 2024-03-07 PROCEDURE — 99385 PREV VISIT NEW AGE 18-39: CPT | Performed by: FAMILY MEDICINE

## 2024-03-07 RX ORDER — FLUTICASONE PROPIONATE 50 MCG
2 SPRAY, SUSPENSION (ML) NASAL DAILY
Qty: 1 ML | Refills: 1 | Status: SHIPPED | OUTPATIENT
Start: 2024-03-07

## 2024-03-07 RX ORDER — ESCITALOPRAM OXALATE 10 MG/1
10 TABLET ORAL DAILY
Qty: 30 TABLET | Refills: 5 | Status: SHIPPED | OUTPATIENT
Start: 2024-03-07 | End: 2024-09-03

## 2024-03-07 NOTE — TELEPHONE ENCOUNTER
Received call from patient.     She has a referral for breast reduction and would like to behzad a consult

## 2024-03-07 NOTE — PROGRESS NOTES
ADULT ANNUAL PHYSICAL  [facility] [place]    NAME: Max Tran  AGE: 18 y.o. SEX: female  : 2005     DATE: 3/8/2024     Assessment and Plan:     Problem List Items Addressed This Visit       Anxiety and depression     See detailed HPI pt has had anx depr dating back to middle school , she spoke w a counselor at that time never took meds , was always able to do ok , sx returned post partum , she wanted to speak w counselor , insurance wouldn't cover this . She has no HI , SI , rec lexapro trial reviewed how to take and potential side affects , we reviewed healthy diet , hydration and regular exercise she has begun a program , rec check labs and f/u here 4 weeks          Relevant Medications    escitalopram (LEXAPRO) 10 mg tablet    Other Relevant Orders    CBC and differential    Well adult exam - Primary     Due for dental exam , she declines flu vaccine and chlamydia screening          Bilateral chronic serous otitis media    Relevant Medications    fluticasone (FLONASE) 50 mcg/act nasal spray    Macromastia     See HPI sx dating back to 6th grade she had eval with plastics at that time , surgery was approved and she didn't follow through she was afraid to , She has had worsening nek , upper back pain and dents in shoulders , all getting worse over time , rec referral to plastic surgery         Relevant Orders    Ambulatory Referral to Plastic Surgery    Chronic upper back pain    Relevant Orders    Ambulatory Referral to Plastic Surgery    Neck pain, chronic    Relevant Orders    Ambulatory Referral to Plastic Surgery     Other Visit Diagnoses       Fatigue, unspecified type        Relevant Orders    CBC and differential    Comprehensive metabolic panel    TSH, 3rd generation with Free T4 reflex    Bleeding gums        Relevant Orders    Ambulatory Referral to Dentistry            Immunizations and preventive care screenings were discussed  with patient today. Appropriate education was printed on patient's after visit summary.    Counseling:  Dental Health: discussed importance of regular tooth brushing, flossing, and dental visits.  Exercise: the importance of regular exercise/physical activity was discussed. Recommend exercise 3-5 times per week for at least 30 minutes.          Return in about 4 weeks (around 4/4/2024) for await labs , f/u with me 4 weeks await plastic surgery eval.     Chief Complaint:     Chief Complaint   Patient presents with    Establish Care     Back pain- patient had daughter last year and its got worse- stomach issues diarrhea        History of Present Illness:     Adult Annual Physical   Patient here for a comprehensive physical exam. New pt here to establish care , wellness exam The patient reports problems - depression  .  Hx depression dating back  to Middle School , spoke w therapist at school fro awhile never took meds , + PP depression wanted to have a therapist her ins wouldn't cover she has foggy mind , can have breakdowns , can hyperventilate when more anxious , lacking ambition , energy feels tired all the time   Her Mom G Mom takes med   Pt has macromastia upper back pain all going on since 6th grade , also has chr neck pain , dents in shoulders , she had been evaluated and was approved for the surgery , she didn't follow through with it was afraid   Diet and Physical Activity  Diet/Nutrition: well balanced diet and limited fruits/vegetables. Has one good meal q day at work [employer] since Oct 2023 water 2-3 bottles q day  Exercise: 3-4 times a week on average.    embarking on exercise regimen 2 x per week [employer] weights and cardio   Depression Screening  PHQ-2/9 Depression Screening    Little interest or pleasure in doing things: 0 - not at all  Feeling down, depressed, or hopeless: 2 - more than half the days  Trouble falling or staying asleep, or sleeping too much: 3 - nearly every  day  Feeling tired or having little energy: 3 - nearly every day  Poor appetite or overeatin - more than half the days  Feeling bad about yourself - or that you are a failure or have let yourself or your family down: 0 - not at all  Trouble concentrating on things, such as reading the newspaper or watching television: 1 - several days  Moving or speaking so slowly that other people could have noticed. Or the opposite - being so fidgety or restless that you have been moving around a lot more than usual: 2 - more than half the days  Thoughts that you would be better off dead, or of hurting yourself in some way: 0 - not at all  PHQ-9 Score: 13  PHQ-9 Interpretation: Moderate depression       General Health  Sleep: gets 7-8 hours of sleep on average. Always has had troubles falling asleep   Hearing: decreased - bilateral. ? Wax   Vision: most recent eye exam <1 year ago. Just had eye exam , new glasses working out well   Dental: no dental visits for >1 year.   Brushes teeth bid     /GYN Health  Follows with gynecology? yes   Last menstrual period on depo   Contraceptive method: injectable contraception.  History of STDs?: no.     Advanced Care Planning  Do you have an advanced directive? no  Do you have a durable medical power of ? no  ACP document given to the patient? no      Review of Systems:     Review of Systems   Past Medical History:     Past Medical History:   Diagnosis Date    Asthma     No known problems     Varicella     immunized      Past Surgical History:     Past Surgical History:   Procedure Laterality Date    NO PAST SURGERIES        Social History:     Social History     Socioeconomic History    Marital status: Single     Spouse name: None    Number of children: 0    Years of education: 12    Highest education level: High school graduate   Occupational History    None   Tobacco Use    Smoking status: Former     Current packs/day: 0.50     Average packs/day: 0.5 packs/day for 2.2 years  (1.1 ttl pk-yrs)     Types: Cigarettes     Start date: 2022     Passive exposure: Yes    Smokeless tobacco: Never    Tobacco comments:     Exposure to secondhand smoke    Vaping Use    Vaping status: Every Day    Substances: Nicotine, Flavoring   Substance and Sexual Activity    Alcohol use: Not Currently     Comment: socially    Drug use: Yes     Types: Marijuana     Comment: pt reports light use throughout pregnancy    Sexual activity: Yes     Partners: Male     Birth control/protection: Condom Male, Injection   Other Topics Concern    None   Social History Narrative    Always uses seat belt    Lives with grandparents     Lives with mother     Poor dental hygiene     Social Determinants of Health     Financial Resource Strain: Medium Risk (2/15/2024)    Overall Financial Resource Strain (CARDIA)     Difficulty of Paying Living Expenses: Somewhat hard   Food Insecurity: No Food Insecurity (2/15/2024)    Hunger Vital Sign     Worried About Running Out of Food in the Last Year: Never true     Ran Out of Food in the Last Year: Never true   Transportation Needs: Unmet Transportation Needs (2/15/2024)    PRAPARE - Transportation     Lack of Transportation (Medical): Yes     Lack of Transportation (Non-Medical): Yes   Physical Activity: Not on file   Stress: No Stress Concern Present (1/13/2023)    [employer] - Occupational Stress Questionnaire     Feeling of Stress : Only a little   Social Connections: Socially Isolated (1/13/2023)    Social Connection and Isolation Panel [NHANES]     Frequency of Communication with Friends and Family: Twice a week     Frequency of Social Gatherings with Friends and Family: Once a week     Attends Evangelical Services: Never     Active Member of Clubs or Organizations: No     Attends Club or Organization Meetings: Never     Marital Status: Never    Intimate Partner Violence: Not on file   Housing Stability: Low Risk  (2/15/2024)    Housing Stability  "Vital Sign     Unable to Pay for Housing in the Last Year: No     Number of Places Lived in the Last Year: 1     Unstable Housing in the Last Year: No      Family History:     Family History   Problem Relation Age of Onset    Other Mother         back pain    Other Father         back pain     Asthma Father     Asthma Brother     Allergies Brother     Asthma Brother     Other Paternal Grandmother         back pain     Other Paternal Grandfather     Asthma Paternal Grandfather     Other Cousin         back pain     Asthma Cousin     Tuberculosis Cousin     Other Family         back pain     Asthma Family     Cancer Family     Tuberculosis Family     Other Family         eye abnormalities      Current Medications:     Current Outpatient Medications   Medication Sig Dispense Refill    escitalopram (LEXAPRO) 10 mg tablet Take 1 tablet (10 mg total) by mouth daily 30 tablet 5    fluticasone (FLONASE) 50 mcg/act nasal spray 2 sprays into each nostril daily 1 mL 1    medroxyPROGESTERone (DEPO-PROVERA) 150 mg/mL injection Inject 1 mL (150 mg total) into a muscle every 3 (three) months 1 mL 0     No current facility-administered medications for this visit.      Allergies:     No Known Allergies   Physical Exam:     BP 92/64 (BP Location: Right arm, Patient Position: Sitting, Cuff Size: Large)   Pulse 80   Temp 98.7 °F (37.1 °C) (Temporal)   Ht 5' 3\" (1.6 m)   Wt 82.6 kg (182 lb)   LMP  (LMP Unknown)   BMI 32.24 kg/m²     Physical Exam     Nya Castillo MD   [facility]    "

## 2024-03-07 NOTE — ASSESSMENT & PLAN NOTE
See detailed HPI pt has had anx depr dating back to middle school , she spoke w a counselor at that time never took meds , was always able to do ok , sx returned post partum , she wanted to speak w counselor , insurance wouldn't cover this . She has no HI , SI , rec lexapro trial reviewed how to take and potential side affects , we reviewed healthy diet , hydration and regular exercise she has begun a program , rec check labs and f/u here 4 weeks

## 2024-03-27 ENCOUNTER — HOSPITAL ENCOUNTER (EMERGENCY)
Facility: HOSPITAL | Age: 19
Discharge: HOME | End: 2024-03-27
Attending: EMERGENCY MEDICINE
Payer: MEDICARE

## 2024-03-27 VITALS
TEMPERATURE: 97.1 F | SYSTOLIC BLOOD PRESSURE: 115 MMHG | RESPIRATION RATE: 18 BRPM | OXYGEN SATURATION: 97 % | DIASTOLIC BLOOD PRESSURE: 57 MMHG | HEART RATE: 97 BPM

## 2024-03-27 DIAGNOSIS — B34.9 VIRAL SYNDROME: Primary | ICD-10-CM

## 2024-03-27 PROCEDURE — 99284 EMERGENCY DEPT VISIT MOD MDM: CPT | Performed by: EMERGENCY MEDICINE

## 2024-03-27 PROCEDURE — 99283 EMERGENCY DEPT VISIT LOW MDM: CPT

## 2024-03-27 RX ORDER — ALBUTEROL SULFATE 90 UG/1
2 AEROSOL, METERED RESPIRATORY (INHALATION) EVERY 6 HOURS PRN
Qty: 6.7 G | Refills: 0 | Status: SHIPPED | OUTPATIENT
Start: 2024-03-27

## 2024-03-27 RX ORDER — IBUPROFEN 400 MG/1
400 TABLET ORAL ONCE
Status: COMPLETED | OUTPATIENT
Start: 2024-03-27 | End: 2024-03-27

## 2024-03-27 RX ORDER — ACETAMINOPHEN 325 MG/1
650 TABLET ORAL ONCE
Status: COMPLETED | OUTPATIENT
Start: 2024-03-27 | End: 2024-03-27

## 2024-03-27 RX ORDER — ONDANSETRON 4 MG/1
4 TABLET, FILM COATED ORAL EVERY 6 HOURS
Qty: 12 TABLET | Refills: 0 | Status: SHIPPED | OUTPATIENT
Start: 2024-03-27

## 2024-03-27 RX ADMIN — IBUPROFEN 400 MG: 400 TABLET, FILM COATED ORAL at 18:42

## 2024-03-27 RX ADMIN — ACETAMINOPHEN 650 MG: 325 TABLET, FILM COATED ORAL at 18:42

## 2024-03-27 NOTE — DISCHARGE INSTRUCTIONS
Please follow up with your PCP regarding today's visit and need for further management. Return to the ED for worsening of condition or new concerning symptoms.

## 2024-03-27 NOTE — Clinical Note
Max Tran was seen and treated in our emergency department on 3/27/2024.                Diagnosis:     Max  .    She may return on this date: 03/30/2024         If you have any questions or concerns, please don't hesitate to call.      Angelica Alejandro MD    ______________________________           _______________          _______________  Hospital Representative                              Date                                Time

## 2024-03-27 NOTE — ED ATTENDING ATTESTATION
3/27/2024  I, Jodi Lu MD, saw and evaluated the patient. I have discussed the patient with the resident/non-physician practitioner and agree with the resident's/non-physician practitioner's findings, Plan of Care, and MDM as documented in the resident's/non-physician practitioner's note, except where noted. All available labs and Radiology studies were reviewed.  I was present for key portions of any procedure(s) performed by the resident/non-physician practitioner and I was immediately available to provide assistance.       At this point I agree with the current assessment done in the Emergency Department.  I have conducted an independent evaluation of this patient a history and physical is as follows:    ED Course         Critical Care Time  Procedures    17 yo female with vomiting, uri symptoms, coughing brown sputum. Pt with hx of asthma, no cp. No abdominal pain.  No diarrhea.  Vss, afebrile, lungs cta, rrr, abdomen soft nontender. Viral illness, symptom control.

## 2024-03-28 ENCOUNTER — TELEPHONE (OUTPATIENT)
Dept: PLASTIC SURGERY | Facility: CLINIC | Age: 19
End: 2024-03-28

## 2024-03-29 NOTE — ED PROVIDER NOTES
"History  Chief Complaint   Patient presents with    Flu Symptoms     Pt c/o congestion, sore throat, and 1 episode of vomiting starting yesterday. Pt c/o head pressure while coughing. Pt states, \"If I could get some IV fluids, that would be great.\" Pt reports starting lexapro 2 weeks ago     Patient is a 18-year-old female presenting cold-like symptoms over the past 2 days.  Patient reports symptoms of cough, congestion, myalgias, generalized fatigue, 2 episodes of NBNB emesis, 1 last night as well as 1 this morning.  Patient states that she was going to come later this morning after she vomited, but she was hungry so went to get something to eat prior to coming to the emergency department.  Patient feels chills, but denies any measured fevers at home.  Patient does have sick contacts at home.  Denies any difficulty breathing, has felt some wheezing which has resolved with albuterol.  Patient states that she feels like her mouth is dry, and requesting IV fluids at this time.          Prior to Admission Medications   Prescriptions Last Dose Informant Patient Reported? Taking?   escitalopram (LEXAPRO) 10 mg tablet   No No   Sig: Take 1 tablet (10 mg total) by mouth daily   fluticasone (FLONASE) 50 mcg/act nasal spray   No No   Si sprays into each nostril daily   medroxyPROGESTERone (DEPO-PROVERA) 150 mg/mL injection   No No   Sig: Inject 1 mL (150 mg total) into a muscle every 3 (three) months      Facility-Administered Medications: None       Past Medical History:   Diagnosis Date    Asthma     No known problems     Varicella     immunized       Past Surgical History:   Procedure Laterality Date    NO PAST SURGERIES         Family History   Problem Relation Age of Onset    Other Mother         back pain    Other Father         back pain     Asthma Father     Asthma Brother     Allergies Brother     Asthma Brother     Other Paternal Grandmother         back pain     Other Paternal Grandfather     Asthma Paternal " Grandfather     Other Cousin         back pain     Asthma Cousin     Tuberculosis Cousin     Other Family         back pain     Asthma Family     Cancer Family     Tuberculosis Family     Other Family         eye abnormalities     I have reviewed and agree with the history as documented.    E-Cigarette/Vaping    E-Cigarette Use Current Every Day User      E-Cigarette/Vaping Substances    Nicotine Yes     THC No     CBD No     Flavoring Yes     Other No     Unknown No      Social History     Tobacco Use    Smoking status: Former     Current packs/day: 0.50     Average packs/day: 0.5 packs/day for 2.2 years (1.1 ttl pk-yrs)     Types: Cigarettes     Start date: 2022     Passive exposure: Yes    Smokeless tobacco: Never    Tobacco comments:     Exposure to secondhand smoke    Vaping Use    Vaping status: Every Day    Substances: Nicotine, Flavoring   Substance Use Topics    Alcohol use: Not Currently     Comment: socially    Drug use: Yes     Types: Marijuana     Comment: pt reports light use throughout pregnancy        Review of Systems   Constitutional:  Positive for fatigue. Negative for chills and fever.   HENT:  Positive for congestion and rhinorrhea.    Respiratory:  Positive for cough. Negative for chest tightness and shortness of breath.    Cardiovascular:  Negative for chest pain.   Gastrointestinal:  Positive for nausea and vomiting. Negative for abdominal distention, abdominal pain and diarrhea.   Genitourinary:  Negative for difficulty urinating, dysuria and hematuria.   Skin:  Negative for color change.   Neurological:  Negative for dizziness, syncope, weakness, numbness and headaches.       Physical Exam  ED Triage Vitals [03/27/24 1745]   Temperature Pulse Respirations Blood Pressure SpO2   (!) 97.1 °F (36.2 °C) 97 18 115/57 97 %      Temp Source Heart Rate Source Patient Position - Orthostatic VS BP Location FiO2 (%)   Temporal Monitor Sitting Left arm --      Pain Score       --             Orthostatic  Vital Signs  Vitals:    03/27/24 1745   BP: 115/57   Pulse: 97   Patient Position - Orthostatic VS: Sitting       Physical Exam  Vitals and nursing note reviewed.   Constitutional:       General: She is not in acute distress.     Appearance: Normal appearance. She is not ill-appearing or toxic-appearing.   HENT:      Head: Normocephalic and atraumatic.      Nose: Congestion (Mild) present.      Mouth/Throat:      Mouth: Mucous membranes are moist.      Pharynx: Oropharynx is clear. No oropharyngeal exudate or posterior oropharyngeal erythema.   Eyes:      General: No scleral icterus.     Extraocular Movements: Extraocular movements intact.   Cardiovascular:      Rate and Rhythm: Normal rate and regular rhythm.      Pulses: Normal pulses.      Heart sounds: Normal heart sounds. No murmur heard.  Pulmonary:      Effort: Pulmonary effort is normal. No respiratory distress.      Breath sounds: Normal breath sounds. No wheezing or rhonchi.   Abdominal:      General: Abdomen is flat. There is no distension.      Palpations: Abdomen is soft.      Tenderness: There is no abdominal tenderness.   Musculoskeletal:         General: Normal range of motion.      Cervical back: Normal range of motion.   Skin:     Capillary Refill: Capillary refill takes less than 2 seconds.   Neurological:      General: No focal deficit present.      Mental Status: She is alert.      Cranial Nerves: No cranial nerve deficit.      Sensory: No sensory deficit.      Motor: No weakness.      Gait: Gait normal.   Psychiatric:         Mood and Affect: Mood normal.         Behavior: Behavior normal.         ED Medications  Medications   acetaminophen (TYLENOL) tablet 650 mg (650 mg Oral Given 3/27/24 1842)   ibuprofen (MOTRIN) tablet 400 mg (400 mg Oral Given 3/27/24 1842)       Diagnostic Studies  Results Reviewed       None                   No orders to display         Procedures  Procedures      ED Course  ED Course as of 03/29/24 1250   Wed Mar 27,  2024 1810 Patient seen and evaluated by me  DDx: Patient's symptoms and physical exam most consistent with viral syndrome at this time.  No concern for strep pharyngitis, pneumonia, UTI, or intra-abdominal pathology.  Patient requesting IV fluids, however is clinically hydrated.  Had discussion with patient regarding IV fluids and that they would decrease her thirst drive.  Patient not feeling any nausea right now.  Lungs are clear, no indication for breathing treatment or antiemetic medication.  Plan: Discharged with albuterol and Zofran prescription.  Reassurance and follow-up with PCP.   1832 Patient discharged at this time, given return precautions and follow-up information.  Given refills for albuterol and prescription for Zofran.  Patient reports understanding, all questions answered.                                       Medical Decision Making  Please see ED course above regarding details of the MDM    Risk  OTC drugs.  Prescription drug management.          Disposition  Final diagnoses:   Viral syndrome     Time reflects when diagnosis was documented in both MDM as applicable and the Disposition within this note       Time User Action Codes Description Comment    3/27/2024  6:30 PM Angelica Alejandro Add [B34.9] Viral syndrome           ED Disposition       ED Disposition   Discharge    Condition   Stable    Date/Time   Wed Mar 27, 2024  6:30 PM    Comment   Max Tran discharge to home/self care.                   Follow-up Information       Follow up With Specialties Details Why Contact Info    Nya Castillo MD Family Medicine   [address] [place] [address]   [address]  [facility] [address] [phone]              Discharge Medication List as of 3/27/2024  6:32 PM        START taking these medications    Details   albuterol (Proventil HFA) 90 mcg/act inhaler Inhale 2 puffs every 6 (six) hours as needed for wheezing, Starting Wed 3/27/2024, Normal      ondansetron (ZOFRAN) 4 mg tablet Take 1 tablet  (4 mg total) by mouth every 6 (six) hours, Starting Wed 3/27/2024, Normal           CONTINUE these medications which have NOT CHANGED    Details   escitalopram (LEXAPRO) 10 mg tablet Take 1 tablet (10 mg total) by mouth daily, Starting Thu 3/7/2024, Until Tue 9/3/2024, Normal      fluticasone (FLONASE) 50 mcg/act nasal spray 2 sprays into each nostril daily, Starting Thu 3/7/2024, Normal      medroxyPROGESTERone (DEPO-PROVERA) 150 mg/mL injection Inject 1 mL (150 mg total) into a muscle every 3 (three) months, Starting Thu 2/15/2024, Normal           No discharge procedures on file.    PDMP Review       None             ED Provider  Attending physically available and evaluated Max Tran. I managed the patient along with the ED Attending.    Electronically Signed by           Angelica Alejandro MD  03/29/24 3986

## 2024-04-11 ENCOUNTER — TELEPHONE (OUTPATIENT)
Dept: INTERNAL MEDICINE CLINIC | Facility: CLINIC | Age: 19
End: 2024-04-11

## 2024-05-01 PROBLEM — Z00.00 WELL ADULT EXAM: Status: RESOLVED | Noted: 2024-03-07 | Resolved: 2024-05-01

## 2024-05-29 ENCOUNTER — TELEPHONE (OUTPATIENT)
Dept: INTERNAL MEDICINE CLINIC | Facility: CLINIC | Age: 19
End: 2024-05-29

## 2024-09-09 ENCOUNTER — TELEPHONE (OUTPATIENT)
Dept: PLASTIC SURGERY | Facility: CLINIC | Age: 19
End: 2024-09-09

## 2024-09-09 NOTE — TELEPHONE ENCOUNTER
Patient needs all criteria for a breast reduction.  Did reach out and re-sent the criteria asking to let me know when all is met and to call me if she has any questions.

## 2024-11-19 ENCOUNTER — TELEPHONE (OUTPATIENT)
Age: 19
End: 2024-11-19

## 2024-11-19 NOTE — TELEPHONE ENCOUNTER
LVM to confirm appt for 11/20 in [place]. Informed pt of $150 cosmetic consultation fee. Advised pt to callback to r/s or cancel this appt if needed.

## 2025-01-27 ENCOUNTER — HOSPITAL ENCOUNTER (EMERGENCY)
Facility: HOSPITAL | Age: 20
Discharge: HOME | End: 2025-01-27
Attending: EMERGENCY MEDICINE
Payer: MEDICARE

## 2025-01-27 ENCOUNTER — APPOINTMENT (EMERGENCY)
Dept: RADIOLOGY | Facility: HOSPITAL | Age: 20
End: 2025-01-27
Payer: MEDICARE

## 2025-01-27 VITALS
OXYGEN SATURATION: 98 % | HEART RATE: 80 BPM | RESPIRATION RATE: 18 BRPM | DIASTOLIC BLOOD PRESSURE: 76 MMHG | SYSTOLIC BLOOD PRESSURE: 116 MMHG | TEMPERATURE: 97.8 F

## 2025-01-27 DIAGNOSIS — Z64.0: ICD-10-CM

## 2025-01-27 DIAGNOSIS — R82.71 BACTERIA IN URINE: ICD-10-CM

## 2025-01-27 DIAGNOSIS — Z3A.01 LESS THAN 8 WEEKS GESTATION OF PREGNANCY: Primary | ICD-10-CM

## 2025-01-27 DIAGNOSIS — R11.2 NAUSEA AND VOMITING: ICD-10-CM

## 2025-01-27 LAB
B-HCG SERPL-ACNC: ABNORMAL MIU/ML (ref 0–5)
BACTERIA UR QL AUTO: ABNORMAL /HPF
BILIRUB UR QL STRIP: NEGATIVE
CLARITY UR: ABNORMAL
COLOR UR: YELLOW
EXT PREGNANCY TEST URINE: POSITIVE
EXT. CONTROL: ABNORMAL
GLUCOSE UR STRIP-MCNC: NEGATIVE MG/DL
HGB UR QL STRIP.AUTO: NEGATIVE
KETONES UR STRIP-MCNC: ABNORMAL MG/DL
LEUKOCYTE ESTERASE UR QL STRIP: ABNORMAL
MUCOUS THREADS UR QL AUTO: ABNORMAL
NITRITE UR QL STRIP: NEGATIVE
NON-SQ EPI CELLS URNS QL MICRO: ABNORMAL /HPF
PH UR STRIP.AUTO: 6.5 [PH]
PROT UR STRIP-MCNC: ABNORMAL MG/DL
RBC #/AREA URNS AUTO: ABNORMAL /HPF
SP GR UR STRIP.AUTO: 1.03 (ref 1–1.03)
UROBILINOGEN UR STRIP-ACNC: 2 MG/DL
WBC #/AREA URNS AUTO: ABNORMAL /HPF

## 2025-01-27 PROCEDURE — 84702 CHORIONIC GONADOTROPIN TEST: CPT

## 2025-01-27 PROCEDURE — 76801 OB US < 14 WKS SINGLE FETUS: CPT

## 2025-01-27 PROCEDURE — 87086 URINE CULTURE/COLONY COUNT: CPT

## 2025-01-27 PROCEDURE — 81001 URINALYSIS AUTO W/SCOPE: CPT

## 2025-01-27 PROCEDURE — 81025 URINE PREGNANCY TEST: CPT

## 2025-01-27 PROCEDURE — 99284 EMERGENCY DEPT VISIT MOD MDM: CPT | Performed by: EMERGENCY MEDICINE

## 2025-01-27 PROCEDURE — 99284 EMERGENCY DEPT VISIT MOD MDM: CPT

## 2025-01-27 PROCEDURE — 36415 COLL VENOUS BLD VENIPUNCTURE: CPT

## 2025-01-27 RX ORDER — ONDANSETRON 4 MG/1
4 TABLET, ORALLY DISINTEGRATING ORAL EVERY 6 HOURS PRN
Qty: 12 TABLET | Refills: 0 | Status: SHIPPED | OUTPATIENT
Start: 2025-01-27

## 2025-01-27 RX ORDER — ONDANSETRON 4 MG/1
4 TABLET, ORALLY DISINTEGRATING ORAL ONCE
Status: COMPLETED | OUTPATIENT
Start: 2025-01-27 | End: 2025-01-27

## 2025-01-27 RX ORDER — NITROFURANTOIN 25; 75 MG/1; MG/1
100 CAPSULE ORAL 2 TIMES DAILY
Qty: 10 CAPSULE | Refills: 0 | Status: SHIPPED | OUTPATIENT
Start: 2025-01-27 | End: 2025-02-01

## 2025-01-27 RX ADMIN — ONDANSETRON 4 MG: 4 TABLET, ORALLY DISINTEGRATING ORAL at 22:19

## 2025-01-27 NOTE — ED ATTENDING ATTESTATION
1/27/2025  I, Ashok Lopez DO, saw and evaluated the patient. I have discussed the patient with the resident/non-physician practitioner and agree with the resident's/non-physician practitioner's findings, Plan of Care, and MDM as documented in the resident's/non-physician practitioner's note, except where noted. All available labs and Radiology studies were reviewed.  I was present for key portions of any procedure(s) performed by the resident/non-physician practitioner and I was immediately available to provide assistance.       At this point I agree with the current assessment done in the Emergency Department.  I have conducted an independent evaluation of this patient a history and physical is as follows:    Patient is a 19-year-old female history of asthma, with 1 prior pregnancy, presents for the last 4 days of some nausea, vomiting, nonbloody, nonbilious emesis, and a couple days of nonbloody, non-mucousy diarrhea.  Several sick contacts.  She is having some occasional abdominal cramping with the vomiting and diarrhea but no other abdominal pain.  No dysuria, hematuria, vaginal bleeding or discharge.  She is sexually active with a male partner, monogamous relationship, last intercourse was several days ago.  She says she is concerned that she may be pregnant.  She says if she is this is not a desired pregnancy, she was not taking fertility treatments or attempting to get pregnant.  He had been on Depo-Provera and was seeing OB/GYN when she was on Depo but she has been off Depo for at least a year or 2, does not have OB/GYN.  She says she has very irregular periods, does not know when her last menstrual period was.    General:  Patient is well-appearing  Head:  Atraumatic  Eyes:  Conjunctiva pink  ENT:  Mucous membranes are moist  Neck:  Supple  Cardiac:  S1-S2, without murmurs  Lungs:  Clear to auscultation bilaterally  Abdomen:  Soft, nontender, normal bowel sounds, no CVA tenderness, no tympany, no  rigidity, no guarding  Extremities:  Normal range of motion  Neurologic:  Awake, fluent speech, normal comprehension. AAOx3.   Skin:  Pink warm and dry  Psychiatric:  Alert, pleasant, cooperative          ED Course     Labs Reviewed   URINALYSIS WITH MICROSCOPIC - Abnormal       Result Value Ref Range Status    Color, UA Yellow   Final    Clarity, UA Turbid   Final    Specific Gravity, UA 1.029  1.003 - 1.030 Final    pH, UA 6.5  4.5, 5.0, 5.5, 6.0, 6.5, 7.0, 7.5, 8.0 Final    Leukocytes, UA Moderate (*) Negative Final    Nitrite, UA Negative  Negative Final    Protein, UA 50 (1+) (*) Negative mg/dl Final    Glucose, UA Negative  Negative mg/dl Final    Ketones, UA >=150 (4+) (*) Negative mg/dl Final    Urobilinogen, UA 2.0 (*) <2.0 mg/dl mg/dl Final    Bilirubin, UA Negative  Negative Final    Occult Blood, UA Negative  Negative Final    RBC, UA 2-4 (*) None Seen, 1-2 /hpf Final    WBC, UA 20-30 (*) None Seen, 1-2 /hpf Final    Epithelial Cells Moderate (*) None Seen, Occasional /hpf Final    Bacteria, UA Occasional  None Seen, Occasional /hpf Final    MUCUS THREADS Innumerable (*) None Seen Final   HCG, QUANTITATIVE - Abnormal    HCG, Quant 60,318.0 (*) 0 - 5 mIU/mL Final    Narrative:      Expected Ranges:    HCG results between 5.0 and 25.0 mIU/mL may be indicative of early pregnancy but should be interpreted in light of the total clinical presentation.    HCG can rise to detectable levels in maiekl and post menopausal women (0-11.6 mIU/mL).     Approximate               Approximate HCG  Gestation age          Concentration ( mIU/mL)  _____________          ______________________   Weeks                      HCG values  0.2-1                       5-50  1-2                           2-3                         100-5000  3-4                         500-62475  4-5                         1000-38988  5-6                         [id number]  6-8                         [id number]  8-12                         [id number]     POCT PREGNANCY, URINE - Abnormal    EXT Preg Test, Ur Positive (*)  Final    Control Valid   Final   URINE CULTURE     US OB < 14 weeks with transvaginal   Final Result      Single live intrauterine gestation at 6 weeks 5 days by crown-rump length.      JESSICA of 09/17/2025.         Workstation performed: [id number]           Patient reassessed several times, remaining comfortable, no additional nausea or vomiting.  Able to tolerate p.o.  At this point the patient has a intrauterine pregnancy, no fertility treatments, heterotopic pregnancy considered extremely unlikely.  There is no evidence of life-threatening ectopic pregnancy.  She does have asymptomatic bacteriuria, was given a prescription for Macrobid.  Does not have GYN, GYN referral was placed for follow-up and discuss further options regarding management of the pregnancy.Supportive care, importance of follow-up and return precautions were discussed with the patient, who expressed understanding.        MEDICAL DECISION MAKING CODING    COLLECTION AND INTERPRETATION OF DATA  I reviewed prior external notes, including March 7, 2024 internal medicine office visit    I ordered each unique test  Tests reviewed personally by me:  Labs: See above  Imaging: I independently interpreted the single live IUP, 6 weeks 5-day gestation.            Critical Care Time  Procedures

## 2025-01-28 NOTE — ED NOTES
Pt provided with urine specimen cup and PO fluids. Aware that another urine specimen is needed.     Lisha Nicholson RN  01/27/25 1938

## 2025-01-29 LAB — BACTERIA UR CULT: NORMAL

## 2025-01-30 NOTE — ED PROVIDER NOTES
Time reflects when diagnosis was documented in both MDM as applicable and the Disposition within this note       Time User Action Codes Description Comment    1/27/2025  6:49 PM AdonaySuyapaJeri Add [Z32.01] Positive pregnancy test     1/27/2025  9:31 PM AdonaySuyapaJeri Remove [Z32.01] Positive pregnancy test     1/27/2025  9:32 PM AdonaySuyapaJeri Add [Z3A.01] Less than 8 weeks gestation of pregnancy     1/27/2025  9:32 PM Jeri Urias Add [R11.2] Nausea and vomiting     1/27/2025  9:34 PM FacJeri chaudhary Add [Z64.0] Pregnancy, unwanted     1/27/2025 10:55 PM Ashok Lopez Add [R82.71] Bacteria in urine           ED Disposition       ED Disposition   Discharge    Condition   Stable    Date/Time   Mon Jan 27, 2025  9:32 PM    Comment   Max Tran discharge to home/self care.                   Assessment & Plan       Medical Decision Making  Max Tran is a 19 y.o. who presents with complaints of nausea, vomiting, abdominal discomfort and loose stools    Vital signs are Hd stable, afebrile    Ddx: pregnancy, viral GI illness    Plan: urine pregnancy test positive  POCUS indeterminate  Quant bHCG obtained  Transvaginal US showing IUP at 6 weeks 5 days  UA not concerning for UTI or asymptomatic bacteriuria  No episodes of vomiting in the ED, patient declined medication for nausea  Referral to OB/GYN provided as patient does not see one regularly   Supportive care instructions and return precautions provided  Patient understands and is agreeable to plan    Disposition: Patient stable for discharge home.         Amount and/or Complexity of Data Reviewed  Labs: ordered.  Radiology: ordered.    Risk  Prescription drug management.             Medications   ondansetron (ZOFRAN-ODT) dispersible tablet 4 mg (4 mg Oral Given 1/27/25 2212)       ED Risk Strat Scores                                              History of Present Illness       Chief Complaint   Patient presents with    Vomiting     Pt  states she's been nauseous and vomiting x 4 days. Pt believes she may be pregnant        Past Medical History:   Diagnosis Date    Asthma     No known problems     Varicella     immunized      Past Surgical History:   Procedure Laterality Date    NO PAST SURGERIES        Family History   Problem Relation Age of Onset    Other Mother         back pain    Other Father         back pain     Asthma Father     Asthma Brother     Allergies Brother     Asthma Brother     Other Paternal Grandmother         back pain     Other Paternal Grandfather     Asthma Paternal Grandfather     Other Cousin         back pain     Asthma Cousin     Tuberculosis Cousin     Other Family         back pain     Asthma Family     Cancer Family     Tuberculosis Family     Other Family         eye abnormalities      Social History     Tobacco Use    Smoking status: Former     Current packs/day: 0.50     Average packs/day: 0.5 packs/day for 3.1 years (1.5 ttl pk-yrs)     Types: Cigarettes     Start date: 2022     Passive exposure: Yes    Smokeless tobacco: Never    Tobacco comments:     Exposure to secondhand smoke    Vaping Use    Vaping status: Every Day    Substances: Nicotine, Flavoring   Substance Use Topics    Alcohol use: Not Currently     Comment: socially    Drug use: Yes     Types: Marijuana     Comment: pt reports light use throughout pregnancy      E-Cigarette/Vaping    E-Cigarette Use Current Every Day User       E-Cigarette/Vaping Substances    Nicotine Yes     THC No     CBD No     Flavoring Yes     Other No     Unknown No       I have reviewed and agree with the history as documented.     Patient is a 18 yo female who presents for evaluation of nausea and non-bilious, non-bloody vomiting. Patient states she has been experiencing symptoms for approx. 4 days. She state symptoms are worse in the morning and she is concerned she may be pregnant. She has had associated loose stools and mild abdominal cramping. She endorses close  contacts with similar symptoms. She cannot recall her last LMP and states her periods are irregular. She has been sexually active with one male partner. She does not currently use any methods of contraception. She states she does not desire to be pregnant. Has had one prior pregnancy. She denies fever, chills, bloody stools, dysuria, hematuria, vaginal bleeding, or vaginal discharge.               Review of Systems   All other systems reviewed and are negative.          Objective       ED Triage Vitals [01/27/25 1700]   Temperature Pulse Blood Pressure Respirations SpO2 Patient Position - Orthostatic VS   97.8 °F (36.6 °C) 80 116/76 18 98 % Lying      Temp Source Heart Rate Source BP Location FiO2 (%) Pain Score    Temporal Monitor Right arm -- No Pain      Vitals      Date and Time Temp Pulse SpO2 Resp BP Pain Score FACES Pain Rating User   01/27/25 1700 97.8 °F (36.6 °C) 80 98 % 18 116/76 No Pain -- KV            Physical Exam  Constitutional:       General: She is not in acute distress.     Appearance: Normal appearance. She is not ill-appearing, toxic-appearing or diaphoretic.   HENT:      Head: Normocephalic and atraumatic.      Nose: Nose normal.      Mouth/Throat:      Mouth: Mucous membranes are moist.      Pharynx: Oropharynx is clear.   Eyes:      Extraocular Movements: Extraocular movements intact.      Conjunctiva/sclera: Conjunctivae normal.   Cardiovascular:      Rate and Rhythm: Normal rate and regular rhythm.      Heart sounds: Normal heart sounds.   Pulmonary:      Effort: Pulmonary effort is normal.      Breath sounds: Normal breath sounds.   Abdominal:      General: Abdomen is flat. There is no distension.      Palpations: Abdomen is soft.      Tenderness: There is no abdominal tenderness. There is no guarding or rebound.   Musculoskeletal:         General: Normal range of motion.      Cervical back: Normal range of motion and neck supple.   Skin:     General: Skin is warm and dry.      Capillary  Refill: Capillary refill takes less than 2 seconds.   Neurological:      General: No focal deficit present.      Mental Status: She is alert and oriented to person, place, and time.   Psychiatric:         Mood and Affect: Mood normal.         Behavior: Behavior normal.         Results Reviewed       Procedure Component Value Units Date/Time    Urine culture [[id number]] Collected: 01/27/25 2201    Lab Status: Final result Specimen: Urine, Clean Catch Updated: 01/29/25 1325     Urine Culture >100,000 cfu/ml    Urinalysis with microscopic [[id number]]  (Abnormal) Collected: 01/27/25 2201    Lab Status: Final result Specimen: Urine, Clean Catch Updated: 01/27/25 2250     Color, UA Yellow     Clarity, UA Turbid     Specific Gravity, UA 1.029     pH, UA 6.5     Leukocytes, UA Moderate     Nitrite, UA Negative     Protein, UA 50 (1+) mg/dl      Glucose, UA Negative mg/dl      Ketones, UA >=150 (4+) mg/dl      Urobilinogen, UA 2.0 mg/dl      Bilirubin, UA Negative     Occult Blood, UA Negative     RBC, UA 2-4 /hpf      WBC, UA 20-30 /hpf      Epithelial Cells Moderate /hpf      Bacteria, UA Occasional /hpf      MUCUS THREADS Innumerable    hCG, quantitative [[id number]]  (Abnormal) Collected: 01/27/25 1926    Lab Status: Final result Specimen: Blood from Arm, Left Updated: 01/27/25 2038     HCG, Quant 60,318.0 mIU/mL     Narrative:       Expected Ranges:    HCG results between 5.0 and 25.0 mIU/mL may be indicative of early pregnancy but should be interpreted in light of the total clinical presentation.    HCG can rise to detectable levels in maikel and post menopausal women (0-11.6 mIU/mL).     Approximate               Approximate HCG  Gestation age          Concentration ( mIU/mL)  _____________          ______________________   Weeks                      HCG values  0.2-1                       5-50  1-2                           2-3                         100-5000  3-4                         500-21812  4-5                          1000-49268  5-6                         [id number]  6-8                         [id number]  8-12                        [id number]      POCT pregnancy, urine [[id number]]  (Abnormal) Collected: 25    Lab Status: Final result Updated: 25     EXT Preg Test, Ur Positive     Control Valid            US OB < 14 weeks with transvaginal   Final Interpretation by Curt Corado DO ([id number])      Single live intrauterine gestation at 6 weeks 5 days by crown-rump length.      JESSICA of 2025.         Workstation performed: [id number]             POC Pelvic US    Date/Time: 2025 8:05 PM    Performed by: Jeri Urias MD  Authorized by: Jeri Urias MD    Unsucessful Attempt    Patient location:  ED  Other Assisting Provider: Yes (comment) (Ashok Lopez)    Procedure details:     Exam Type:  Diagnostic    Indications: evaluate for IUP      Assessment for: confirm intrauterine pregnancy      Technique:  Transabdominal obstetric (HCG+) exam    Views obtained: uterus (transverse and sagittal)      Image quality: non-diagnostic      Image availability:  Not saved  Uterine findings:     Intrauterine pregnancy: not identified      Single gestation: not identified      Gestational sac: not identified      Yolk sac: not identified      Fetal pole: not identified      Fetal heart rate: not identified    Interpretation:     Ultrasound impressions: indeterminate      Pregnancy findings: indeterminate        ED Medication and Procedure Management   Prior to Admission Medications   Prescriptions Last Dose Informant Patient Reported? Taking?   albuterol (Proventil HFA) 90 mcg/act inhaler   No No   Sig: Inhale 2 puffs every 6 (six) hours as needed for wheezing   escitalopram (LEXAPRO) 10 mg tablet   No No   Sig: Take 1 tablet (10 mg total) by mouth daily   fluticasone (FLONASE) 50 mcg/act nasal spray   No No   Si sprays into each nostril daily   medroxyPROGESTERone (DEPO-PROVERA) 150  mg/mL injection   No No   Sig: Inject 1 mL (150 mg total) into a muscle every 3 (three) months   ondansetron (ZOFRAN) 4 mg tablet   No No   Sig: Take 1 tablet (4 mg total) by mouth every 6 (six) hours      Facility-Administered Medications: None     Discharge Medication List as of 1/27/2025 10:56 PM        START taking these medications    Details   nitrofurantoin (MACROBID) 100 mg capsule Take 1 capsule (100 mg total) by mouth 2 (two) times a day for 5 days, Starting Mon 1/27/2025, Until Sat 2/1/2025, Normal      ondansetron (ZOFRAN-ODT) 4 mg disintegrating tablet Take 1 tablet (4 mg total) by mouth every 6 (six) hours as needed for nausea or vomiting, Starting Mon 1/27/2025, Normal           CONTINUE these medications which have NOT CHANGED    Details   albuterol (Proventil HFA) 90 mcg/act inhaler Inhale 2 puffs every 6 (six) hours as needed for wheezing, Starting Wed 3/27/2024, Normal      escitalopram (LEXAPRO) 10 mg tablet Take 1 tablet (10 mg total) by mouth daily, Starting Thu 3/7/2024, Until Tue 9/3/2024, Normal      fluticasone (FLONASE) 50 mcg/act nasal spray 2 sprays into each nostril daily, Starting Thu 3/7/2024, Normal      medroxyPROGESTERone (DEPO-PROVERA) 150 mg/mL injection Inject 1 mL (150 mg total) into a muscle every 3 (three) months, Starting Thu 2/15/2024, Normal      ondansetron (ZOFRAN) 4 mg tablet Take 1 tablet (4 mg total) by mouth every 6 (six) hours, Starting Wed 3/27/2024, Normal             ED SEPSIS DOCUMENTATION   Time reflects when diagnosis was documented in both MDM as applicable and the Disposition within this note       Time User Action Codes Description Comment    1/27/2025  6:49 PM Jeri Urias Add [Z32.01] Positive pregnancy test     1/27/2025  9:31 PM Jeri Urias Remove [Z32.01] Positive pregnancy test     1/27/2025  9:32 PM Jeri Urias Add [Z3A.01] Less than 8 weeks gestation of pregnancy     1/27/2025  9:32 PM Jeri Urias Add [R11.2] Nausea and  vomiting     1/27/2025  9:34 PM Jeri Urias [Z64.0] Pregnancy, unwanted     1/27/2025 10:55 PM Ashok Lopez [R82.71] Bacteria in urine                  Jeri Urias MD  01/29/25 2010

## 2025-07-09 ENCOUNTER — HOSPITAL ENCOUNTER (EMERGENCY)
Facility: HOSPITAL | Age: 20
Discharge: HOME | End: 2025-07-09
Attending: EMERGENCY MEDICINE | Admitting: EMERGENCY MEDICINE
Payer: MEDICARE

## 2025-07-09 VITALS
RESPIRATION RATE: 16 BRPM | OXYGEN SATURATION: 99 % | HEART RATE: 100 BPM | DIASTOLIC BLOOD PRESSURE: 59 MMHG | TEMPERATURE: 98.1 F | SYSTOLIC BLOOD PRESSURE: 114 MMHG

## 2025-07-09 DIAGNOSIS — O09.30 PRENATAL CARE INSUFFICIENT: ICD-10-CM

## 2025-07-09 DIAGNOSIS — R10.9 ABDOMINAL PAIN DURING PREGNANCY, ANTEPARTUM: Primary | ICD-10-CM

## 2025-07-09 DIAGNOSIS — Z34.93 THIRD TRIMESTER PREGNANCY: ICD-10-CM

## 2025-07-09 DIAGNOSIS — O26.899 ABDOMINAL PAIN DURING PREGNANCY, ANTEPARTUM: Primary | ICD-10-CM

## 2025-07-09 LAB
ALBUMIN SERPL BCG-MCNC: 3.3 G/DL (ref 3.5–5)
ALP SERPL-CCNC: 60 U/L (ref 34–104)
ALT SERPL W P-5'-P-CCNC: 7 U/L (ref 7–52)
ANION GAP SERPL CALCULATED.3IONS-SCNC: 7 MMOL/L (ref 4–13)
AST SERPL W P-5'-P-CCNC: 11 U/L (ref 13–39)
BACTERIA UR QL AUTO: ABNORMAL /HPF
BASOPHILS # BLD AUTO: 0.04 THOUSANDS/ÂΜL (ref 0–0.1)
BASOPHILS NFR BLD AUTO: 0 % (ref 0–1)
BILIRUB DIRECT SERPL-MCNC: 0.09 MG/DL (ref 0–0.2)
BILIRUB SERPL-MCNC: 0.24 MG/DL (ref 0.2–1)
BILIRUB UR QL STRIP: NEGATIVE
BUN SERPL-MCNC: 7 MG/DL (ref 5–25)
CALCIUM ALBUM COR SERPL-MCNC: 9.3 MG/DL (ref 8.3–10.1)
CALCIUM SERPL-MCNC: 8.7 MG/DL (ref 8.4–10.2)
CHLORIDE SERPL-SCNC: 106 MMOL/L (ref 96–108)
CLARITY UR: ABNORMAL
CO2 SERPL-SCNC: 24 MMOL/L (ref 21–32)
COLOR UR: ABNORMAL
CREAT SERPL-MCNC: 0.48 MG/DL (ref 0.6–1.3)
EOSINOPHIL # BLD AUTO: 0.1 THOUSAND/ÂΜL (ref 0–0.61)
EOSINOPHIL NFR BLD AUTO: 1 % (ref 0–6)
ERYTHROCYTE [DISTWIDTH] IN BLOOD BY AUTOMATED COUNT: 13.2 % (ref 11.6–15.1)
GFR SERPL CREATININE-BSD FRML MDRD: 139 ML/MIN/1.73SQ M
GLUCOSE SERPL-MCNC: 77 MG/DL (ref 65–140)
GLUCOSE UR STRIP-MCNC: NEGATIVE MG/DL
HCT VFR BLD AUTO: 33.5 % (ref 34.8–46.1)
HGB BLD-MCNC: 11.2 G/DL (ref 11.5–15.4)
HGB UR QL STRIP.AUTO: NEGATIVE
IMM GRANULOCYTES # BLD AUTO: 0.09 THOUSAND/UL (ref 0–0.2)
IMM GRANULOCYTES NFR BLD AUTO: 1 % (ref 0–2)
KETONES UR STRIP-MCNC: ABNORMAL MG/DL
LEUKOCYTE ESTERASE UR QL STRIP: ABNORMAL
LIPASE SERPL-CCNC: 15 U/L (ref 11–82)
LYMPHOCYTES # BLD AUTO: 2.08 THOUSANDS/ÂΜL (ref 0.6–4.47)
LYMPHOCYTES NFR BLD AUTO: 21 % (ref 14–44)
MCH RBC QN AUTO: 30.1 PG (ref 26.8–34.3)
MCHC RBC AUTO-ENTMCNC: 33.4 G/DL (ref 31.4–37.4)
MCV RBC AUTO: 90 FL (ref 82–98)
MONOCYTES # BLD AUTO: 0.98 THOUSAND/ÂΜL (ref 0.17–1.22)
MONOCYTES NFR BLD AUTO: 10 % (ref 4–12)
MUCOUS THREADS UR QL AUTO: ABNORMAL
NEUTROPHILS # BLD AUTO: 6.77 THOUSANDS/ÂΜL (ref 1.85–7.62)
NEUTS SEG NFR BLD AUTO: 67 % (ref 43–75)
NITRITE UR QL STRIP: NEGATIVE
NON-SQ EPI CELLS URNS QL MICRO: ABNORMAL /HPF
NRBC BLD AUTO-RTO: 0 /100 WBCS
PH UR STRIP.AUTO: 6 [PH]
PLATELET # BLD AUTO: 232 THOUSANDS/UL (ref 149–390)
PMV BLD AUTO: 9.7 FL (ref 8.9–12.7)
POTASSIUM SERPL-SCNC: 3.9 MMOL/L (ref 3.5–5.3)
PROT SERPL-MCNC: 6.3 G/DL (ref 6.4–8.4)
PROT UR STRIP-MCNC: ABNORMAL MG/DL
RBC # BLD AUTO: 3.72 MILLION/UL (ref 3.81–5.12)
RBC #/AREA URNS AUTO: ABNORMAL /HPF
SODIUM SERPL-SCNC: 137 MMOL/L (ref 135–147)
SP GR UR STRIP.AUTO: 1.02 (ref 1–1.03)
UROBILINOGEN UR STRIP-ACNC: <2 MG/DL
WBC # BLD AUTO: 10.06 THOUSAND/UL (ref 4.31–10.16)
WBC #/AREA URNS AUTO: ABNORMAL /HPF

## 2025-07-09 PROCEDURE — 99284 EMERGENCY DEPT VISIT MOD MDM: CPT

## 2025-07-09 PROCEDURE — 36415 COLL VENOUS BLD VENIPUNCTURE: CPT

## 2025-07-09 PROCEDURE — 80053 COMPREHEN METABOLIC PANEL: CPT

## 2025-07-09 PROCEDURE — 83690 ASSAY OF LIPASE: CPT

## 2025-07-09 PROCEDURE — 99285 EMERGENCY DEPT VISIT HI MDM: CPT | Performed by: EMERGENCY MEDICINE

## 2025-07-09 PROCEDURE — 82248 BILIRUBIN DIRECT: CPT

## 2025-07-09 PROCEDURE — 85025 COMPLETE CBC W/AUTO DIFF WBC: CPT

## 2025-07-09 PROCEDURE — 81001 URINALYSIS AUTO W/SCOPE: CPT

## 2025-07-09 NOTE — ED ATTENDING ATTESTATION
2025  IGiselle MD, saw and evaluated the patient. I have discussed the patient with the resident/non-physician practitioner and agree with the resident's/non-physician practitioner's findings, Plan of Care, and MDM as documented in the resident's/non-physician practitioner's note, except where noted. All available labs and Radiology studies were reviewed.  I was present for key portions of any procedure(s) performed by the resident/non-physician practitioner and I was immediately available to provide assistance.       At this point I agree with the current assessment done in the Emergency Department.  I have conducted an independent evaluation of this patient a history and physical is as follows:    Chief Complaint   Patient presents with    Pregnancy Problem     Pt reports she's pregnant but unsure of how far along. Hasn't been to the doctor because she hasn't had time. So she wants an ultrasound to make sure everything is okay. She said she feels heavy when she's walking     20 y.o.  female presenting with an episode of abdominal pain earlier today, resolved at present.  Patient has irregular menses, is not certain of her last menstrual period.  She was seen in our emergency department on 2025, underwent TVUS at that time which revealed IUP at 6 weeks and 5 days.  Patient has not had any prenatal care since that time.  This morning, she developed right-sided abdominal pain along with some shortness of breath.  Both symptoms are now resolved.  Patient has felt the baby move.  She has not had any vaginal bleeding.  She has had headaches, but no double vision, no difficulty with speech, and no numbness or weakness in any of the extremities.  She has not had any burning with urination.  No history of kidney stones.  Patient has a difficulty getting to OB/GYN due to lack of transportation.    ED Triage Vitals [25 1219]   Temperature Pulse Respirations Blood Pressure SpO2   98.1 °F (36.7 °C)  100 16 114/59 99 %      Temp Source Heart Rate Source Patient Position - Orthostatic VS BP Location FiO2 (%)   Temporal Monitor Sitting Left arm --      Pain Score       --           Vital signs and nursing notes reviewed    CONSTITUTIONAL: female appearing stated age resting in bed, in no acute distress  HEENT: atraumatic, normocephalic. Sclera anicteric, conjunctiva are not injected. Moist oral mucosa  CARDIOVASCULAR/CHEST: RRR   PULMONARY: Breathing comfortably on RA   ABDOMEN: Gravid uterus present with fundus palpable just below xiphoid.  Nontender to palpation.    MSK: moves all extremities, no deformities, no peripheral edema, no calf asymmetry  NEURO: Awake, alert, and oriented x 3. Face symmetric. Moves all extremities spontaneously. No focal neurologic deficits  SKIN: Warm, appears well-perfused  MENTAL STATUS: Normal affect    Labs Reviewed   COMPREHENSIVE METABOLIC PANEL - Abnormal       Result Value Ref Range Status    Sodium 137  135 - 147 mmol/L Final    Potassium 3.9  3.5 - 5.3 mmol/L Final    Chloride 106  96 - 108 mmol/L Final    CO2 24  21 - 32 mmol/L Final    ANION GAP 7  4 - 13 mmol/L Final    BUN 7  5 - 25 mg/dL Final    Creatinine 0.48 (*) 0.60 - 1.30 mg/dL Final    Comment: Standardized to IDMS reference method    Glucose 77  65 - 140 mg/dL Final    Comment: If the patient is fasting, the ADA then defines impaired fasting glucose as > 100 mg/dL and diabetes as > or equal to 123 mg/dL.    Calcium 8.7  8.4 - 10.2 mg/dL Final    Corrected Calcium 9.3  8.3 - 10.1 mg/dL Final    AST 11 (*) 13 - 39 U/L Final    ALT 7  7 - 52 U/L Final    Comment: Specimen collection should occur prior to Sulfasalazine administration due to the potential for falsely depressed results.     Alkaline Phosphatase 60  34 - 104 U/L Final    Total Protein 6.3 (*) 6.4 - 8.4 g/dL Final    Albumin 3.3 (*) 3.5 - 5.0 g/dL Final    Total Bilirubin 0.24  0.20 - 1.00 mg/dL Final    Comment: Use of this assay is not recommended for  patients undergoing treatment with eltrombopag due to the potential for falsely elevated results.  N-acetyl-p-benzoquinone imine (metabolite of Acetaminophen) will generate erroneously low results in samples for patients that have taken an overdose of Acetaminophen.    eGFR 139  ml/min/1.73sq m Final    Narrative:     National Kidney Disease Foundation guidelines for Chronic Kidney Disease (CKD):     Stage 1 with normal or high GFR (GFR > 90 mL/min/1.73 square meters)    Stage 2 Mild CKD (GFR = 60-89 mL/min/1.73 square meters)    Stage 3A Moderate CKD (GFR = 45-59 mL/min/1.73 square meters)    Stage 3B Moderate CKD (GFR = 30-44 mL/min/1.73 square meters)    Stage 4 Severe CKD (GFR = 15-29 mL/min/1.73 square meters)    Stage 5 End Stage CKD (GFR <15 mL/min/1.73 square meters)  Note: GFR calculation is accurate only with a steady state creatinine   CBC AND DIFFERENTIAL - Abnormal    WBC 10.06  4.31 - 10.16 Thousand/uL Final    RBC 3.72 (*) 3.81 - 5.12 Million/uL Final    Hemoglobin 11.2 (*) 11.5 - 15.4 g/dL Final    Hematocrit 33.5 (*) 34.8 - 46.1 % Final    MCV 90  82 - 98 fL Final    MCH 30.1  26.8 - 34.3 pg Final    MCHC 33.4  31.4 - 37.4 g/dL Final    RDW 13.2  11.6 - 15.1 % Final    MPV 9.7  8.9 - 12.7 fL Final    Platelets 232  149 - 390 Thousands/uL Final    nRBC 0  /100 WBCs Final    Segmented % 67  43 - 75 % Final    Immature Grans % 1  0 - 2 % Final    Lymphocytes % 21  14 - 44 % Final    Monocytes % 10  4 - 12 % Final    Eosinophils Relative 1  0 - 6 % Final    Basophils Relative 0  0 - 1 % Final    Absolute Neutrophils 6.77  1.85 - 7.62 Thousands/µL Final    Absolute Immature Grans 0.09  0.00 - 0.20 Thousand/uL Final    Absolute Lymphocytes 2.08  0.60 - 4.47 Thousands/µL Final    Absolute Monocytes 0.98  0.17 - 1.22 Thousand/µL Final    Eosinophils Absolute 0.10  0.00 - 0.61 Thousand/µL Final    Basophils Absolute 0.04  0.00 - 0.10 Thousands/µL Final   UA W REFLEX TO MICROSCOPIC WITH REFLEX TO CULTURE -  Abnormal    Color, UA Light Yellow   Final    Clarity, UA Turbid   Final    Specific Gravity, UA 1.025  1.003 - 1.030 Final    pH, UA 6.0  4.5, 5.0, 5.5, 6.0, 6.5, 7.0, 7.5, 8.0 Final    Leukocytes, UA Trace (*) Negative Final    Nitrite, UA Negative  Negative Final    Protein, UA Trace (*) Negative mg/dl Final    Glucose, UA Negative  Negative mg/dl Final    Ketones, UA Trace (*) Negative mg/dl Final    Urobilinogen, UA <2.0  <2.0 mg/dl mg/dl Final    Bilirubin, UA Negative  Negative Final    Occult Blood, UA Negative  Negative Final   URINE MICROSCOPIC - Abnormal    RBC, UA 1-2  None Seen, 1-2 /hpf Final    WBC, UA 1-2  None Seen, 1-2 /hpf Final    Epithelial Cells Occasional  None Seen, Occasional /hpf Final    Bacteria, UA None Seen  None Seen, Occasional /hpf Final    MUCUS THREADS Moderate (*) None Seen Final   LIPASE - Normal    Lipase 15  11 - 82 u/L Final   BILIRUBIN, DIRECT - Normal    Bilirubin, Direct 0.09  0.00 - 0.20 mg/dL Final       ED Course     20-year-old female currently at 30w1d per ultrasound on 1/27/25 presenting with an episode of abdominal pain, now resolved. Exam is with non-surgical abdomen, nothing to suggest acute appendicitis, acute cholecystitis. No vaginal bleeding, no loss of fluid. Feels the baby move. Current pregnancy complicated by lack of prenatal care. Limited bedside ultrasound today reveals live meléndez fetus in vertex position, fetal heart rate 134. Bilateral kidneys are without hydronephrosis to suggest a stone.  Labs are as above. We are strongly recommending a close follow up with ObGyn for prenatal care. Patient aware we are unable to perform most of the standard assessment to ensure the baby is doing well, such as anatomy scan, biophysical profile, etc. Patient will also need labs including blood type, GBS status, etc, prior to delivery. Case discussed with [facility] today, they suggest either transfer to L&D today for assessment versus close follow up outpatient.  Patient would like to follow up on outpatient basis and it is essential she does so to ensure she and the baby both do well.    Patient discharged to home with recommendations for symptom control, return precautions, and plan for follow up.

## 2025-07-09 NOTE — DISCHARGE INSTRUCTIONS
Discharge Instructions for Pregnant Patient with Abdominal Pain and Unremarkable Workup   The patient was evaluated for acute abdominal pain during pregnancy. A comprehensive assessment, including physical examination, laboratory studies, and appropriate imaging (ultrasound per American College of Radiology and Infectious Diseases Society of [place] guidelines), did not reveal a surgical, obstetric, or medical cause for the pain.   The patient is currently stable, afebrile, and without evidence of acute pathology. No intervention is indicated at this time.  Recommendations:   Return immediately for any of the following: worsening or persistent abdominal pain, new onset fever, vomiting, vaginal bleeding, leakage of fluid, decreased fetal movement, syncope, or any other concerning symptoms. These may indicate evolving pathology requiring urgent reassessment.   Maintain adequate oral hydration and a regular diet as tolerated. Avoid high-fat meals if there is a history of biliary symptoms.   Use acetaminophen for pain control as needed, not exceeding recommended dosing.   Continue routine prenatal care.  Outpatient Follow-up:   Schedule follow-up with the primary obstetric provider within 1 week, or sooner if symptoms recur or worsen. Early follow-up is recommended to reassess for evolving or intermittent pathology, as some conditions may not be apparent on initial evaluation.   If pain persists beyond 72 hours, or if new symptoms develop, further evaluation may be warranted, including repeat imaging or specialist referral (e.g., maternal-fetal medicine, gastroenterology, or surgery), as per American College of Radiology and American Gastroenterological Association guidance.

## 2025-07-09 NOTE — ED PROVIDER NOTES
Time reflects when diagnosis was documented in both MDM as applicable and the Disposition within this note       Time User Action Codes Description Comment    7/9/2025  3:38 PM Iain George Add [O26.899,  R10.9] Abdominal pain during pregnancy, antepartum           ED Disposition       ED Disposition   Discharge    Condition   Stable    Date/Time   Wed Jul 9, 2025  3:33 PM    Comment   Max Pariszarro discharge to home/self care.                   Assessment & Plan       Medical Decision Making  Patient is a 20 y.o. female  presenting with diffuse abdominal pain and concern for fetus wellbeing.    Vital signs: /59 (BP Location: Left arm)   Pulse 100   Temp 98.1 °F (36.7 °C) (Temporal)   Resp 16   SpO2 99%      Pertinent physical exam:    Differential diagnosis includes but is not limited to:   Appendectomy  - Benign abdomen will do CBC with differential.  Pending results will consider MRI    Urinary tract infection  - UA will consider culture based on results    HELLP  - UA, CMP    Hydronephrosis  - Renal POCUS     Fetal viability  -Transabdominal POCUS      View ED course above for further discussion on patient workup.     Review of Previous Medical Records:   Reviewed previous visit to this emergency department for initial evaluation of pregnancy.    All labs reviewed and utilized in the medical decision making process  I reviewed all testing with the patient.     ED course:  Ultrasound done at bedside demonstrating a viable intrauterine pregnancy consistent with a fetal heart rate of about 134 bpm.  Bilateral kidneys appreciated no signs of hydronephrosis.     CBC suggestive of anemia in pregnancy.    Reevaluation:   Patient continues to demonstrate no acute signs of distress with a benign abdominal exam    Disposition:   Discharged with follow-up care with obstetrics.  Requires a formal fetal ultrasound study.         Amount and/or Complexity of Data Reviewed  Labs: ordered.              Medications - No data to display    ED Risk Strat Scores                    No data recorded        SBIRT 22yo+      Flowsheet Row Most Recent Value   Initial Alcohol Screen: US AUDIT-C     1. How often do you have a drink containing alcohol? 0 Filed at: 07/09/2025 1220   2. How many drinks containing alcohol do you have on a typical day you are drinking?  0 Filed at: 07/09/2025 1220   3b. FEMALE Any Age, or MALE 65+: How often do you have 4 or more drinks on one occassion? 0 Filed at: 07/09/2025 1220   Audit-C Score 0 Filed at: 07/09/2025 1220   SHALINI: How many times in the past year have you...    Used an illegal drug or used a prescription medication for non-medical reasons? Never Filed at: 07/09/2025 1220                            History of Present Illness       Chief Complaint   Patient presents with    Pregnancy Problem     Pt reports she's pregnant but unsure of how far along. Hasn't been to the doctor because she hasn't had time. So she wants an ultrasound to make sure everything is okay. She said she feels heavy when she's walking       Past Medical History[1]   Past Surgical History[2]   Family History[3]   Social History[4]   E-Cigarette/Vaping    E-Cigarette Use Current Every Day User       E-Cigarette/Vaping Substances    Nicotine Yes     THC No     CBD No     Flavoring Yes     Other No     Unknown No       I have reviewed and agree with the history as documented.     This is a 20-year-old female coming in with a chief complaint of general on wellness patient states that she had a prior positive pregnancy test in January at this hospital.  Since then the patient has not been able to get prenatal care.  She is concerned because today she was having some abdominal discomfort and diffuse abdominal pain localizes to right lower quadrant.  Additionally she reports that she was feeling some shortness of breath with exertion.  Patient is primarily concerned with having an evaluation of her fetus. Patient  denies any nausea vomiting diarrhea hematuria dysuria or vaginal bleeding.      Pregnancy Problem  Associated symptoms: abdominal pain    Associated symptoms: no back pain, no dizziness, no dysuria, no fever, no nausea and no vaginal discharge        Review of Systems   Constitutional:  Negative for chills and fever.   Eyes:  Negative for pain and visual disturbance.   Respiratory:  Negative for cough and shortness of breath.    Cardiovascular:  Negative for chest pain and palpitations.   Gastrointestinal:  Positive for abdominal pain. Negative for diarrhea, nausea and vomiting.   Genitourinary:  Negative for dysuria, hematuria, pelvic pain, vaginal bleeding, vaginal discharge and vaginal pain.   Musculoskeletal:  Negative for arthralgias and back pain.   Skin:  Negative for color change and rash.   Neurological:  Negative for dizziness, seizures, syncope, weakness, light-headedness, numbness and headaches.   All other systems reviewed and are negative.          Objective       ED Triage Vitals [07/09/25 1219]   Temperature Pulse Blood Pressure Respirations SpO2 Patient Position - Orthostatic VS   98.1 °F (36.7 °C) 100 114/59 16 99 % Sitting      Temp Source Heart Rate Source BP Location FiO2 (%) Pain Score    Temporal Monitor Left arm -- --      Vitals      Date and Time Temp Pulse SpO2 Resp BP Pain Score FACES Pain Rating User   07/09/25 1219 98.1 °F (36.7 °C) 100 99 % 16 114/59 -- -- KR            Physical Exam  Vitals and nursing note reviewed.   Constitutional:       General: She is not in acute distress.     Appearance: She is well-developed.   HENT:      Head: Normocephalic and atraumatic.     Eyes:      Conjunctiva/sclera: Conjunctivae normal.       Cardiovascular:      Rate and Rhythm: Normal rate and regular rhythm.      Heart sounds: No murmur heard.  Pulmonary:      Effort: Pulmonary effort is normal. No respiratory distress.      Breath sounds: Normal breath sounds.   Abdominal:      General: Bowel  sounds are normal.      Palpations: Abdomen is soft.      Tenderness: There is abdominal tenderness in the right lower quadrant. Negative signs include Young's sign, McBurney's sign, psoas sign and obturator sign.      Comments: Fundus approximately 10 cm above the umbilicus      Musculoskeletal:         General: No swelling.      Cervical back: Neck supple.     Skin:     General: Skin is warm and dry.      Capillary Refill: Capillary refill takes less than 2 seconds.     Neurological:      Mental Status: She is alert.     Psychiatric:         Mood and Affect: Mood normal.         Results Reviewed       Procedure Component Value Units Date/Time    Comprehensive metabolic panel [[id number]]  (Abnormal) Collected: 07/09/25 1336    Lab Status: Final result Specimen: Blood from Arm, Left Updated: 07/09/25 1414     Sodium 137 mmol/L      Potassium 3.9 mmol/L      Chloride 106 mmol/L      CO2 24 mmol/L      ANION GAP 7 mmol/L      BUN 7 mg/dL      Creatinine 0.48 mg/dL      Glucose 77 mg/dL      Calcium 8.7 mg/dL      Corrected Calcium 9.3 mg/dL      AST 11 U/L      ALT 7 U/L      Alkaline Phosphatase 60 U/L      Total Protein 6.3 g/dL      Albumin 3.3 g/dL      Total Bilirubin 0.24 mg/dL      eGFR 139 ml/min/1.73sq m     Narrative:      National Kid[employer] Disease Foundation guidelines for Chronic Kidney Disease (CKD):     Stage 1 with normal or high GFR (GFR > 90 mL/min/1.73 square meters)    Stage 2 Mild CKD (GFR = 60-89 mL/min/1.73 square meters)    Stage 3A Moderate CKD (GFR = 45-59 mL/min/1.73 square meters)    Stage 3B Moderate CKD (GFR = 30-44 mL/min/1.73 square meters)    Stage 4 Severe CKD (GFR = 15-29 mL/min/1.73 square meters)    Stage 5 End Stage CKD (GFR <15 mL/min/1.73 square meters)  Note: GFR calculation is accurate only with a steady state creatinine    Lipase [[id number]]  (Normal) Collected: 07/09/25 1336    Lab Status: Final result Specimen: Blood from Arm, Left Updated: 07/09/25 1414     Lipase 15 u/L      Bilirubin, direct [[id number]]  (Normal) Collected: 07/09/25 1336    Lab Status: Final result Specimen: Blood from Arm, Left Updated: 07/09/25 1414     Bilirubin, Direct 0.09 mg/dL     Urine Microscopic [[id number]]  (Abnormal) Collected: 07/09/25 1338    Lab Status: Final result Specimen: Urine, Clean Catch Updated: 07/09/25 1409     RBC, UA 1-2 /hpf      WBC, UA 1-2 /hpf      Epithelial Cells Occasional /hpf      Bacteria, UA None Seen /hpf      MUCUS THREADS Moderate    UA w Reflex to Microscopic w Reflex to Culture [[id number]]  (Abnormal) Collected: 07/09/25 1338    Lab Status: Final result Specimen: Urine, Clean Catch Updated: 07/09/25 1357     Color, UA Light Yellow     Clarity, UA Turbid     Specific Gravity, UA 1.025     pH, UA 6.0     Leukocytes, UA Trace     Nitrite, UA Negative     Protein, UA Trace mg/dl      Glucose, UA Negative mg/dl      Ketones, UA Trace mg/dl      Urobilinogen, UA <2.0 mg/dl      Bilirubin, UA Negative     Occult Blood, UA Negative    CBC and differential [[id number]]  (Abnormal) Collected: 07/09/25 1336    Lab Status: Final result Specimen: Blood from Arm, Left Updated: 07/09/25 1355     WBC 10.06 Thousand/uL      RBC 3.72 Million/uL      Hemoglobin 11.2 g/dL      Hematocrit 33.5 %      MCV 90 fL      MCH 30.1 pg      MCHC 33.4 g/dL      RDW 13.2 %      MPV 9.7 fL      Platelets 232 Thousands/uL      nRBC 0 /100 WBCs      Segmented % 67 %      Immature Grans % 1 %      Lymphocytes % 21 %      Monocytes % 10 %      Eosinophils Relative 1 %      Basophils Relative 0 %      Absolute Neutrophils 6.77 Thousands/µL      Absolute Immature Grans 0.09 Thousand/uL      Absolute Lymphocytes 2.08 Thousands/µL      Absolute Monocytes 0.98 Thousand/µL      Eosinophils Absolute 0.10 Thousand/µL      Basophils Absolute 0.04 Thousands/µL             No orders to display       POC Pelvic US    Date/Time: 7/9/2025 3:38 PM    Performed by: Iain George DO  Authorized by: Iain George DO     Patient location:  Bedside  Procedure details:     Exam Type:  Diagnostic    Indications: pregnant with abdominal pain      Assessment for: evaluate fetal viability      Technique:  Transabdominal obstetric (HCG+) exam    Views obtained: uterus (transverse and sagittal)      Image quality: limited diagnostic      Image availability:  Images available in PACS  Uterine findings:     Intrauterine pregnancy: identified      Single gestation: identified      Fetal heart rate: identified      Fetal heart rate (bpm):  134  Right adnexa findings:     Right ovary:  Not visualized  Left adnexa findings:     Left ovary:  Not visualized  Other findings:     Free pelvic fluid: not identified      Free peritoneal fluid: not identified    Interpretation:     Ultrasound impressions: normal      Pregnancy findings: intrauterine pregnancy (IUP)    Comments:      Viable intrauterine pregnancy identified.  Fetal heart rate determined to be 134.  Bilateral kidneys visualized no hydronephrosis appreciated.      ED Medication and Procedure Management   Prior to Admission Medications   Prescriptions Last Dose Informant Patient Reported? Taking?   albuterol (Proventil HFA) 90 mcg/act inhaler   No No   Sig: Inhale 2 puffs every 6 (six) hours as needed for wheezing   escitalopram (LEXAPRO) 10 mg tablet   No No   Sig: Take 1 tablet (10 mg total) by mouth daily   fluticasone (FLONASE) 50 mcg/act nasal spray   No No   Si sprays into each nostril daily   medroxyPROGESTERone (DEPO-PROVERA) 150 mg/mL injection   No No   Sig: Inject 1 mL (150 mg total) into a muscle every 3 (three) months   ondansetron (ZOFRAN) 4 mg tablet   No No   Sig: Take 1 tablet (4 mg total) by mouth every 6 (six) hours   ondansetron (ZOFRAN-ODT) 4 mg disintegrating tablet   No No   Sig: Take 1 tablet (4 mg total) by mouth every 6 (six) hours as needed for nausea or vomiting      Facility-Administered Medications: None     Discharge Medication List as of 2025  3:48  PM        CONTINUE these medications which have NOT CHANGED    Details   albuterol (Proventil HFA) 90 mcg/act inhaler Inhale 2 puffs every 6 (six) hours as needed for wheezing, Starting Wed 3/27/2024, Normal      escitalopram (LEXAPRO) 10 mg tablet Take 1 tablet (10 mg total) by mouth daily, Starting Thu 3/7/2024, Until Tue 9/3/2024, Normal      fluticasone (FLONASE) 50 mcg/act nasal spray 2 sprays into each nostril daily, Starting Thu 3/7/2024, Normal      medroxyPROGESTERone (DEPO-PROVERA) 150 mg/mL injection Inject 1 mL (150 mg total) into a muscle every 3 (three) months, Starting Thu 2/15/2024, Normal      ondansetron (ZOFRAN) 4 mg tablet Take 1 tablet (4 mg total) by mouth every 6 (six) hours, Starting Wed 3/27/2024, Normal      ondansetron (ZOFRAN-ODT) 4 mg disintegrating tablet Take 1 tablet (4 mg total) by mouth every 6 (six) hours as needed for nausea or vomiting, Starting Mon 1/27/2025, Normal           No discharge procedures on file.  ED SEPSIS DOCUMENTATION   Time reflects when diagnosis was documented in both MDM as applicable and the Disposition within this note       Time User Action Codes Description Comment    7/9/2025  3:38 PM Iain George Add [O26.899,  R10.9] Abdominal pain during pregnancy, antepartum                    [1]   Past Medical History:  Diagnosis Date    Asthma     No known problems     Varicella     immunized   [2]   Past Surgical History:  Procedure Laterality Date    NO PAST SURGERIES     [3]   Family History  Problem Relation Name Age of Onset    Other Mother          back pain    Other Father          back pain     Asthma Father      Asthma Brother      Allergies Brother      Asthma Brother      Other Paternal Grandmother          back pain     Other Paternal Grandfather      Asthma Paternal Grandfather      Other Cousin          back pain     Asthma Cousin      Tuberculosis Cousin      Other Family PatGreatGM         back pain     Asthma Family PatGreatGM     Cancer  Family PatGreatGM     Tuberculosis Family PatGreatGM     Other Family          eye abnormalities   [4]   Social History  Tobacco Use    Smoking status: Former     Current packs/day: 0.50     Average packs/day: 0.5 packs/day for 3.5 years (1.8 ttl pk-yrs)     Types: Cigarettes     Start date: 2022     Passive exposure: Yes    Smokeless tobacco: Never    Tobacco comments:     Exposure to secondhand smoke    Vaping Use    Vaping status: Every Day    Substances: Nicotine, Flavoring   Substance Use Topics    Alcohol use: Not Currently     Comment: socially    Drug use: Yes     Types: Marijuana     Comment: pt reports light use throughout pregnancy        Iain George DO  07/09/25 4307

## 2025-07-14 ENCOUNTER — ROUTINE PRENATAL (OUTPATIENT)
Dept: OBGYN CLINIC | Facility: CLINIC | Age: 20
End: 2025-07-14
Attending: EMERGENCY MEDICINE

## 2025-07-14 VITALS
BODY MASS INDEX: 34.78 KG/M2 | WEIGHT: 189 LBS | HEART RATE: 89 BPM | DIASTOLIC BLOOD PRESSURE: 58 MMHG | HEIGHT: 62 IN | SYSTOLIC BLOOD PRESSURE: 109 MMHG

## 2025-07-14 DIAGNOSIS — Z23 ENCOUNTER FOR IMMUNIZATION: ICD-10-CM

## 2025-07-14 DIAGNOSIS — Z11.3 ROUTINE SCREENING FOR STI (SEXUALLY TRANSMITTED INFECTION): ICD-10-CM

## 2025-07-14 DIAGNOSIS — Z72.0 VAPES NICOTINE CONTAINING SUBSTANCE: ICD-10-CM

## 2025-07-14 DIAGNOSIS — F41.9 ANXIETY AND DEPRESSION: ICD-10-CM

## 2025-07-14 DIAGNOSIS — F32.A ANXIETY AND DEPRESSION: ICD-10-CM

## 2025-07-14 DIAGNOSIS — O99.519 ASTHMA DURING PREGNANCY: ICD-10-CM

## 2025-07-14 DIAGNOSIS — J45.909 ASTHMA DURING PREGNANCY: ICD-10-CM

## 2025-07-14 DIAGNOSIS — Z34.83 PRENATAL CARE, SUBSEQUENT PREGNANCY IN THIRD TRIMESTER: Primary | ICD-10-CM

## 2025-07-14 DIAGNOSIS — Z3A.30 30 WEEKS GESTATION OF PREGNANCY: ICD-10-CM

## 2025-07-14 PROCEDURE — 99214 OFFICE O/P EST MOD 30 MIN: CPT | Performed by: OBSTETRICS & GYNECOLOGY

## 2025-07-14 PROCEDURE — 90715 TDAP VACCINE 7 YRS/> IM: CPT

## 2025-07-14 PROCEDURE — G0145 SCR C/V CYTO,THINLAYER,RESCR: HCPCS | Performed by: PATHOLOGY

## 2025-07-14 PROCEDURE — 87591 N.GONORRHOEAE DNA AMP PROB: CPT | Performed by: EMERGENCY MEDICINE

## 2025-07-14 PROCEDURE — 90471 IMMUNIZATION ADMIN: CPT

## 2025-07-14 PROCEDURE — 87491 CHLMYD TRACH DNA AMP PROBE: CPT | Performed by: EMERGENCY MEDICINE

## 2025-07-14 RX ORDER — ESCITALOPRAM OXALATE 5 MG/1
5 TABLET ORAL DAILY
Qty: 30 TABLET | Refills: 2 | Status: SHIPPED | OUTPATIENT
Start: 2025-07-14

## 2025-07-14 NOTE — PROGRESS NOTES
[facility]  Prenatal H&P Visit      PLAN:  1. Prenatal care, subsequent pregnancy in third trimester  -     Liquid-based pap, screening  -     Chlamydia/GC amplified DNA by PCR  -     Tdap Vaccine greater than or equal to 8yo  2. Encounter for immunization  -     Tdap Vaccine greater than or equal to 8yo  3. 30 weeks gestation of pregnancy  Assessment & Plan:  Prenatal H&P completed today  Labs  Pap smear collected today and GC/CT collected today  Prenatal panel reviewed  MSAFP to be completed between 16-18 weeks - unable to complete due to scant prenatal care  Genetic screening: will follow-up with [facility]  Vaccines:  Flu: will offer during flu season  Tdap: given 25  Contraception: Depo   Feeding plan: Bottle feed, breast pump  Birth plan:  without epidural, consider epidural   Ultrasounds:   Sent referral to [facility]  Return to clinic in 2 weeks   Orders:  -     HIV 1/2 AG/AB w Reflex [facility] for 2 yr old and above; Future; Expected date: Collect anytime  -     Hepatitis C antibody; Future; Expected date: Collect anytime  -     UA (URINE) with reflex to Scope; Future; Expected date: Collect anytime  -     Urine culture; Future; Expected date: Collect anytime  -     Hepatitis B surface antigen; Future; Expected date: Collect anytime  -     Rubella antibody, IgG; Future; Expected date: Collect anytime  -     Type and screen; Future; Expected date: Collect anytime  -     RPR-Syphilis Screening (Total Syphilis IGG/IGM); Future; Expected date: Collect anytime  -     Hepatitis B surface antibody; Future; Expected date: Collect anytime  -     Anemia Panel w/Reflex, OB; Future; Expected date: Collect anytime  -     Ambulatory Referral to Maternal Fetal Medicine; Future; Expected date: 07/15/2025  -     Glucose, 1H PG; Future; Expected date: Collect anytime  -     escitalopram (LEXAPRO) 5 mg tablet; Take 1 tablet (5 mg total) by mouth daily  4. Routine screening for STI (sexually transmitted infection)  -     " Chlamydia/GC amplified DNA by PCR  5. Asthma during pregnancy  Assessment & Plan:  Not taking meds for asthma  Last albuterol use years ago  6. Anxiety and depression  Assessment & Plan:  Previously prescribed Lexapro 10 mg- stopped taking because felt \"numb\"  Restart Lexapro 5 mg due to increased anxiety  7. Vapes nicotine containing substance  Assessment & Plan:  Counseled on importance of stopping vape use in pregnancy due to associated effects on neurodevelopment, cardiac regulation, and fetal nicotine withdrawal         History of Present Illness   Max Tran is a 20 y.o.  at 30w5d who presents for prenatal H&P visit. This is an intended and desired pregnancy. JESSICA by ED US 25 was 2025. Her previous pregnancy was uncomplicated. She was delivered vaginally in . She hx of STD/STI. No hx of bleeding disorders, DM, HTN. She notes a family history of autism in her younger brother. Otherwise, she denies a family history of inheritable conditions such as physical or intellectual disabilities, birth defects, blood disorders, heart or neural tube defects. Denies hx of breast, ovarian, uterine CA. She endorses use of vape/nicotine throughout the day depending on levels of anxiety. She denies tobacco, alcohol or recreational drug use.     She denies cramping or contractions. She denies leakage of fluid and vaginal bleeding. She denies nausea, vomiting, headache, edema. Her pregnancy is complicated by lack of prenatal care.     Current medication(s) include: N/A. Pertinent gynecological diagnoses:  no abnormal pap smears, no fibroids, ovarian cysts, no endometriosis.      ROS: relevant ROS above    Historical Information     Past Medical History[1]    Past Surgical History[2]    Family History[3]    Social History     Socioeconomic History    Marital status: Single     Spouse name: Not on file    Number of children: 0    Years of education: 12    Highest education level: High school graduate "   Occupational History    Not on file   Tobacco Use    Smoking status: Former     Current packs/day: 0.50     Average packs/day: 0.5 packs/day for 3.5 years (1.8 ttl pk-yrs)     Types: Cigarettes     Start date: 2022     Passive exposure: Yes    Smokeless tobacco: Never    Tobacco comments:     Exposure to secondhand smoke    Vaping Use    Vaping status: Some Days    Substances: Nicotine, Flavoring   Substance and Sexual Activity    Alcohol use: Not Currently     Comment: socially    Drug use: Yes     Types: Marijuana     Comment: pt reports light use throughout pregnancy    Sexual activity: Yes     Partners: Male     Birth control/protection: Condom Male, Injection   Other Topics Concern    Not on file   Social History Narrative    Always uses seat belt    Lives with grandparents     Lives with mother     Poor dental hygiene     Social Drivers of Health     Financial Resource Strain: Medium Risk (7/14/2025)    Overall Financial Resource Strain (CARDIA)     Difficulty of Paying Living Expenses: Somewhat hard   Food Insecurity: No Food Insecurity (7/14/2025)    Nursing - Inadequate Food Risk Classification     Worried About Running Out of Food in the Last Year: Never true     Ran Out of Food in the Last Year: Never true     Ran Out of Food in the Last Year: Not on file   Transportation Needs: Unmet Transportation Needs (7/14/2025)    PRAPARE - Transportation     Lack of Transportation (Medical): No     Lack of Transportation (Non-Medical): Yes   Physical Activity: Not on file   Stress: No Stress Concern Present (1/13/2023)    [employer] - Occupational Stress Questionnaire     Feeling of Stress : Only a little   Social Connections: Socially Isolated (1/13/2023)    Social Connection and Isolation Panel     Frequency of Communication with Friends and Family: Twice a week     Frequency of Social Gatherings with Friends and Family: Once a week     Attends [employer] Services: Never     Active  Member of Clubs or Organizations: No     Attends Club or Organization Meetings: Never     Marital Status: Never    Intimate Partner Violence: Not on file   Housing Stability: Unknown (7/14/2025)    Housing Stability Vital Sign     Unable to Pay for Housing in the Last Year: Patient unable to answer     Number of Times Moved in the Last Year: 0     Homeless in the Last Year: No       Meds/Allergies   Allergies[4]    Objective   Vitals:    07/14/25 1300   BP: 109/58   Pulse: 89       General: Well appearing, no distress  Respiratory: Unlabored breathing  Cardiovascular: Regular rate.  Abdomen: Soft, gravid, nontender  Fundal Height: Appropriate for gestational age.  : Normal appearing external female genitalia, normal appearing urethral meatus. On speculum exam, normal appearing vaginal epithelium, thin white vaginal discharge, no bleeding, grossly normal appearing cervix.      FHT: 134  Fundal height: 31 cm      Elena Garcia MD  7/14/2025  3:27 PM          [1]   Past Medical History:  Diagnosis Date    Asthma     No known problems     Varicella     immunized   [2]   Past Surgical History:  Procedure Laterality Date    NO PAST SURGERIES     [3]   Family History  Problem Relation Name Age of Onset    Other Mother          back pain    Other Father          back pain     Asthma Father      Asthma Brother      Allergies Brother      Asthma Brother      Other Paternal Grandmother          back pain     Other Paternal Grandfather      Asthma Paternal Grandfather      Other Cousin          back pain     Asthma Cousin      Tuberculosis Cousin      Other Family PatGreatGM         back pain     Asthma Family PatGreatGM     Cancer Family PatGreatGM     Tuberculosis Family PatGreatGM     Other Family          eye abnormalities   [4] No Known Allergies

## 2025-07-14 NOTE — ASSESSMENT & PLAN NOTE
Counseled on importance of stopping vape use in pregnancy due to associated effects on neurodevelopment, cardiac regulation, and fetal nicotine withdrawal

## 2025-07-14 NOTE — ASSESSMENT & PLAN NOTE
"Previously prescribed Lexapro 10 mg- stopped taking because felt \"numb\"  Restart Lexapro 5 mg due to increased anxiety  "

## 2025-07-14 NOTE — ASSESSMENT & PLAN NOTE
Prenatal H&P completed today  Labs  Pap smear collected today and GC/CT collected today  Prenatal panel reviewed  MSAFP to be completed between 16-18 weeks - unable to complete due to scant prenatal care  Genetic screening: will follow-up with [facility]  Vaccines:  Flu: will offer during flu season  Tdap: given 25  Contraception: Depo   Feeding plan: Bottle feed, breast pump  Birth plan:  without epidural, consider epidural   Ultrasounds:   Sent referral to [facility]  Return to clinic in 2 weeks

## 2025-07-15 ENCOUNTER — TELEPHONE (OUTPATIENT)
Age: 20
End: 2025-07-15

## 2025-07-16 ENCOUNTER — APPOINTMENT (OUTPATIENT)
Dept: LAB | Facility: CLINIC | Age: 20
End: 2025-07-16
Payer: MEDICARE

## 2025-07-16 LAB
C TRACH DNA SPEC QL NAA+PROBE: NEGATIVE
N GONORRHOEA DNA SPEC QL NAA+PROBE: NEGATIVE

## 2025-07-17 DIAGNOSIS — Z3A.31 31 WEEKS GESTATION OF PREGNANCY: Primary | ICD-10-CM

## 2025-07-18 LAB
LAB AP GYN PRIMARY INTERPRETATION: ABNORMAL
Lab: ABNORMAL
PATH INTERP SPEC-IMP: ABNORMAL

## 2025-07-18 PROCEDURE — G0124 SCREEN C/V THIN LAYER BY MD: HCPCS | Performed by: PATHOLOGY

## 2025-07-28 ENCOUNTER — RESULTS FOLLOW-UP (OUTPATIENT)
Dept: LABOR AND DELIVERY | Facility: HOSPITAL | Age: 20
End: 2025-07-28

## 2025-08-11 ENCOUNTER — HOSPITAL ENCOUNTER (EMERGENCY)
Facility: HOSPITAL | Age: 20
Discharge: HOME | End: 2025-08-12
Attending: EMERGENCY MEDICINE
Payer: MEDICARE

## 2025-08-20 ENCOUNTER — PATIENT OUTREACH (OUTPATIENT)
Dept: OBGYN CLINIC | Facility: CLINIC | Age: 20
End: 2025-08-20

## 2025-08-20 ENCOUNTER — ROUTINE PRENATAL (OUTPATIENT)
Facility: HOSPITAL | Age: 20
End: 2025-08-20
Payer: MEDICARE

## 2025-08-20 VITALS
DIASTOLIC BLOOD PRESSURE: 64 MMHG | HEIGHT: 62 IN | HEART RATE: 95 BPM | SYSTOLIC BLOOD PRESSURE: 112 MMHG | BODY MASS INDEX: 35.99 KG/M2 | WEIGHT: 195.6 LBS

## 2025-08-20 DIAGNOSIS — Z3A.36 36 WEEKS GESTATION OF PREGNANCY: Primary | ICD-10-CM

## 2025-08-20 DIAGNOSIS — Z3A.30 30 WEEKS GESTATION OF PREGNANCY: ICD-10-CM

## 2025-08-20 PROBLEM — O98.813 CHLAMYDIA INFECTION AFFECTING PREGNANCY IN THIRD TRIMESTER: Status: ACTIVE | Noted: 2025-08-20

## 2025-08-20 PROBLEM — A74.9 CHLAMYDIA INFECTION AFFECTING PREGNANCY IN THIRD TRIMESTER: Status: ACTIVE | Noted: 2025-08-20

## 2025-08-20 PROBLEM — R82.71 GBS BACTERIURIA: Status: ACTIVE | Noted: 2025-08-20

## 2025-08-21 ENCOUNTER — ROUTINE PRENATAL (OUTPATIENT)
Dept: OBGYN CLINIC | Facility: CLINIC | Age: 20
End: 2025-08-21

## 2025-08-21 VITALS
BODY MASS INDEX: 35.7 KG/M2 | HEIGHT: 62 IN | WEIGHT: 194 LBS | HEART RATE: 89 BPM | DIASTOLIC BLOOD PRESSURE: 58 MMHG | SYSTOLIC BLOOD PRESSURE: 101 MMHG

## 2025-08-21 DIAGNOSIS — A74.9 CHLAMYDIA INFECTION AFFECTING PREGNANCY IN THIRD TRIMESTER: ICD-10-CM

## 2025-08-21 DIAGNOSIS — Z3A.36 36 WEEKS GESTATION OF PREGNANCY: Primary | ICD-10-CM

## 2025-08-21 DIAGNOSIS — O98.813 CHLAMYDIA INFECTION AFFECTING PREGNANCY IN THIRD TRIMESTER: ICD-10-CM

## 2025-08-21 DIAGNOSIS — R87.613 HIGH GRADE SQUAMOUS INTRAEPITHELIAL LESION (HGSIL) ON CERVICOVAGINAL CYTOLOGY: ICD-10-CM

## 2025-08-21 PROBLEM — Z87.59 HISTORY OF PRIOR PREGNANCY WITH IUGR NEWBORN: Status: ACTIVE | Noted: 2025-08-21

## 2025-08-21 PROBLEM — O99.210 OBESITY IN PREGNANCY, ANTEPARTUM: Status: ACTIVE | Noted: 2025-08-21

## 2025-08-21 PROCEDURE — 99213 OFFICE O/P EST LOW 20 MIN: CPT | Performed by: OBSTETRICS & GYNECOLOGY
